# Patient Record
Sex: MALE | Race: WHITE | NOT HISPANIC OR LATINO | Employment: OTHER | URBAN - METROPOLITAN AREA
[De-identification: names, ages, dates, MRNs, and addresses within clinical notes are randomized per-mention and may not be internally consistent; named-entity substitution may affect disease eponyms.]

---

## 2017-01-05 ENCOUNTER — OFFICE VISIT - HEALTHEAST (OUTPATIENT)
Dept: INTERNAL MEDICINE | Facility: CLINIC | Age: 82
End: 2017-01-05

## 2017-01-05 DIAGNOSIS — R56.9 CONVULSIONS, UNSPECIFIED CONVULSION TYPE (H): ICD-10-CM

## 2017-01-05 ASSESSMENT — MIFFLIN-ST. JEOR: SCORE: 1518.14

## 2017-02-06 ENCOUNTER — OFFICE VISIT - HEALTHEAST (OUTPATIENT)
Dept: INTERNAL MEDICINE | Facility: CLINIC | Age: 82
End: 2017-02-06

## 2017-02-06 DIAGNOSIS — G40.209 PARTIAL EPILEPSY WITH IMPAIRMENT OF CONSCIOUSNESS (H): ICD-10-CM

## 2017-02-06 DIAGNOSIS — E55.9 HYPOVITAMINOSIS D: ICD-10-CM

## 2017-02-06 DIAGNOSIS — M50.90 CERVICAL DISC DISEASE: ICD-10-CM

## 2017-02-06 DIAGNOSIS — R25.9 INVOLUNTARY MOVEMENTS: ICD-10-CM

## 2017-02-06 DIAGNOSIS — N40.0 BPH (BENIGN PROSTATIC HYPERPLASIA): ICD-10-CM

## 2017-02-06 DIAGNOSIS — M25.472 ANKLE SWELLING, LEFT: ICD-10-CM

## 2017-02-06 DIAGNOSIS — S43.422S SPRAIN OF LEFT ROTATOR CUFF CAPSULE, SEQUELA: ICD-10-CM

## 2017-02-06 DIAGNOSIS — Z13.29 SCREENING FOR HYPOTHYROIDISM: ICD-10-CM

## 2017-02-06 DIAGNOSIS — K59.00 CONSTIPATION: ICD-10-CM

## 2017-02-06 DIAGNOSIS — K42.9 UMBILICAL HERNIA WITHOUT OBSTRUCTION AND WITHOUT GANGRENE: ICD-10-CM

## 2017-02-06 ASSESSMENT — MIFFLIN-ST. JEOR: SCORE: 1509.07

## 2017-02-07 ENCOUNTER — COMMUNICATION - HEALTHEAST (OUTPATIENT)
Dept: INTERNAL MEDICINE | Facility: CLINIC | Age: 82
End: 2017-02-07

## 2017-03-17 ENCOUNTER — COMMUNICATION - HEALTHEAST (OUTPATIENT)
Dept: INTERNAL MEDICINE | Facility: CLINIC | Age: 82
End: 2017-03-17

## 2017-05-16 ENCOUNTER — COMMUNICATION - HEALTHEAST (OUTPATIENT)
Dept: SCHEDULING | Facility: CLINIC | Age: 82
End: 2017-05-16

## 2017-08-10 ENCOUNTER — RECORDS - HEALTHEAST (OUTPATIENT)
Dept: ADMINISTRATIVE | Facility: OTHER | Age: 82
End: 2017-08-10

## 2017-10-25 ENCOUNTER — OFFICE VISIT - HEALTHEAST (OUTPATIENT)
Dept: PODIATRY | Facility: CLINIC | Age: 82
End: 2017-10-25

## 2017-10-25 DIAGNOSIS — L60.0 INGROWN TOENAIL: ICD-10-CM

## 2017-10-25 DIAGNOSIS — L60.2 ONYCHAUXIS: ICD-10-CM

## 2017-11-08 ENCOUNTER — COMMUNICATION - HEALTHEAST (OUTPATIENT)
Dept: ADMINISTRATIVE | Facility: CLINIC | Age: 82
End: 2017-11-08

## 2017-12-01 ENCOUNTER — COMMUNICATION - HEALTHEAST (OUTPATIENT)
Dept: ADMINISTRATIVE | Facility: CLINIC | Age: 82
End: 2017-12-01

## 2018-01-22 ENCOUNTER — RECORDS - HEALTHEAST (OUTPATIENT)
Dept: ADMINISTRATIVE | Facility: OTHER | Age: 83
End: 2018-01-22

## 2018-05-31 ENCOUNTER — OFFICE VISIT - HEALTHEAST (OUTPATIENT)
Dept: FAMILY MEDICINE | Facility: CLINIC | Age: 83
End: 2018-05-31

## 2018-05-31 ENCOUNTER — RECORDS - HEALTHEAST (OUTPATIENT)
Dept: GENERAL RADIOLOGY | Facility: CLINIC | Age: 83
End: 2018-05-31

## 2018-05-31 DIAGNOSIS — K59.00 CONSTIPATION, UNSPECIFIED: ICD-10-CM

## 2018-05-31 DIAGNOSIS — K59.00 CONSTIPATION: ICD-10-CM

## 2018-05-31 ASSESSMENT — MIFFLIN-ST. JEOR: SCORE: 1482.76

## 2018-06-04 ENCOUNTER — OFFICE VISIT - HEALTHEAST (OUTPATIENT)
Dept: FAMILY MEDICINE | Facility: CLINIC | Age: 83
End: 2018-06-04

## 2018-06-04 DIAGNOSIS — M54.2 NECK PAIN ON RIGHT SIDE: ICD-10-CM

## 2018-07-06 ENCOUNTER — OFFICE VISIT - HEALTHEAST (OUTPATIENT)
Dept: FAMILY MEDICINE | Facility: CLINIC | Age: 83
End: 2018-07-06

## 2018-07-06 DIAGNOSIS — K59.00 CONSTIPATION: ICD-10-CM

## 2018-07-06 DIAGNOSIS — J32.9 SINUSITIS, CHRONIC: ICD-10-CM

## 2018-07-06 DIAGNOSIS — F95.2 TOURETTE'S DISORDER: ICD-10-CM

## 2018-07-06 DIAGNOSIS — N40.0 BPH (BENIGN PROSTATIC HYPERPLASIA): ICD-10-CM

## 2018-07-06 DIAGNOSIS — F41.9 ANXIETY: ICD-10-CM

## 2018-07-06 DIAGNOSIS — M50.30 DDD (DEGENERATIVE DISC DISEASE), CERVICAL: ICD-10-CM

## 2018-07-06 DIAGNOSIS — G40.209 PARTIAL EPILEPSY WITH IMPAIRMENT OF CONSCIOUSNESS (H): ICD-10-CM

## 2018-07-06 DIAGNOSIS — M75.100 ROTATOR CUFF TEAR: ICD-10-CM

## 2018-07-06 LAB
ANION GAP SERPL CALCULATED.3IONS-SCNC: 8 MMOL/L (ref 5–18)
BASOPHILS # BLD AUTO: 0 THOU/UL (ref 0–0.2)
BASOPHILS NFR BLD AUTO: 1 % (ref 0–2)
BUN SERPL-MCNC: 14 MG/DL (ref 8–28)
CALCIUM SERPL-MCNC: 9.3 MG/DL (ref 8.5–10.5)
CHLORIDE BLD-SCNC: 107 MMOL/L (ref 98–107)
CO2 SERPL-SCNC: 28 MMOL/L (ref 22–31)
CREAT SERPL-MCNC: 1 MG/DL (ref 0.7–1.3)
EOSINOPHIL # BLD AUTO: 0.1 THOU/UL (ref 0–0.4)
EOSINOPHIL NFR BLD AUTO: 2 % (ref 0–6)
ERYTHROCYTE [DISTWIDTH] IN BLOOD BY AUTOMATED COUNT: 11.3 % (ref 11–14.5)
GFR SERPL CREATININE-BSD FRML MDRD: >60 ML/MIN/1.73M2
GLUCOSE BLD-MCNC: 95 MG/DL (ref 70–125)
HCT VFR BLD AUTO: 42.4 % (ref 40–54)
HGB BLD-MCNC: 13.9 G/DL (ref 14–18)
LYMPHOCYTES # BLD AUTO: 1.4 THOU/UL (ref 0.8–4.4)
LYMPHOCYTES NFR BLD AUTO: 22 % (ref 20–40)
MCH RBC QN AUTO: 31.9 PG (ref 27–34)
MCHC RBC AUTO-ENTMCNC: 32.8 G/DL (ref 32–36)
MCV RBC AUTO: 97 FL (ref 80–100)
MONOCYTES # BLD AUTO: 0.3 THOU/UL (ref 0–0.9)
MONOCYTES NFR BLD AUTO: 6 % (ref 2–10)
NEUTROPHILS # BLD AUTO: 4.3 THOU/UL (ref 2–7.7)
NEUTROPHILS NFR BLD AUTO: 70 % (ref 50–70)
PLATELET # BLD AUTO: 226 THOU/UL (ref 140–440)
PMV BLD AUTO: 7.9 FL (ref 7–10)
POTASSIUM BLD-SCNC: 4.4 MMOL/L (ref 3.5–5)
RBC # BLD AUTO: 4.35 MILL/UL (ref 4.4–6.2)
SODIUM SERPL-SCNC: 143 MMOL/L (ref 136–145)
TSH SERPL DL<=0.005 MIU/L-ACNC: 1.96 UIU/ML (ref 0.3–5)
WBC: 6.1 THOU/UL (ref 4–11)

## 2018-07-06 ASSESSMENT — MIFFLIN-ST. JEOR: SCORE: 1478.45

## 2018-07-09 ENCOUNTER — COMMUNICATION - HEALTHEAST (OUTPATIENT)
Dept: FAMILY MEDICINE | Facility: CLINIC | Age: 83
End: 2018-07-09

## 2018-07-23 ENCOUNTER — OFFICE VISIT - HEALTHEAST (OUTPATIENT)
Dept: FAMILY MEDICINE | Facility: CLINIC | Age: 83
End: 2018-07-23

## 2018-07-23 ENCOUNTER — COMMUNICATION - HEALTHEAST (OUTPATIENT)
Dept: FAMILY MEDICINE | Facility: CLINIC | Age: 83
End: 2018-07-23

## 2018-07-23 DIAGNOSIS — G45.9 TRANSIENT CEREBRAL ISCHEMIA, UNSPECIFIED TYPE: ICD-10-CM

## 2018-07-23 DIAGNOSIS — G40.209 PARTIAL EPILEPSY WITH IMPAIRMENT OF CONSCIOUSNESS (H): ICD-10-CM

## 2018-07-23 DIAGNOSIS — N40.0 BPH (BENIGN PROSTATIC HYPERPLASIA): ICD-10-CM

## 2018-07-30 ENCOUNTER — COMMUNICATION - HEALTHEAST (OUTPATIENT)
Dept: FAMILY MEDICINE | Facility: CLINIC | Age: 83
End: 2018-07-30

## 2018-08-05 ENCOUNTER — ANESTHESIA - HEALTHEAST (OUTPATIENT)
Dept: SURGERY | Facility: CLINIC | Age: 83
End: 2018-08-05

## 2018-08-05 ENCOUNTER — SURGERY - HEALTHEAST (OUTPATIENT)
Dept: SURGERY | Facility: CLINIC | Age: 83
End: 2018-08-05

## 2018-08-05 ASSESSMENT — MIFFLIN-ST. JEOR: SCORE: 1466.43

## 2018-08-08 ENCOUNTER — COMMUNICATION - HEALTHEAST (OUTPATIENT)
Dept: CARE COORDINATION | Facility: CLINIC | Age: 83
End: 2018-08-08

## 2018-08-09 ENCOUNTER — AMBULATORY - HEALTHEAST (OUTPATIENT)
Dept: CARE COORDINATION | Facility: CLINIC | Age: 83
End: 2018-08-09

## 2018-08-09 DIAGNOSIS — G45.9 TIA (TRANSIENT ISCHEMIC ATTACK): ICD-10-CM

## 2018-08-13 ENCOUNTER — COMMUNICATION - HEALTHEAST (OUTPATIENT)
Dept: FAMILY MEDICINE | Facility: CLINIC | Age: 83
End: 2018-08-13

## 2018-08-13 DIAGNOSIS — K42.9 UMBILICAL HERNIA WITHOUT OBSTRUCTION AND WITHOUT GANGRENE: ICD-10-CM

## 2018-08-13 DIAGNOSIS — N40.0 BPH (BENIGN PROSTATIC HYPERPLASIA): ICD-10-CM

## 2018-08-20 ENCOUNTER — AMBULATORY - HEALTHEAST (OUTPATIENT)
Dept: FAMILY MEDICINE | Facility: CLINIC | Age: 83
End: 2018-08-20

## 2018-08-20 ENCOUNTER — COMMUNICATION - HEALTHEAST (OUTPATIENT)
Dept: SCHEDULING | Facility: CLINIC | Age: 83
End: 2018-08-20

## 2018-08-27 ENCOUNTER — AMBULATORY - HEALTHEAST (OUTPATIENT)
Dept: FAMILY MEDICINE | Facility: CLINIC | Age: 83
End: 2018-08-27

## 2018-08-27 DIAGNOSIS — G45.9 TRANSIENT CEREBRAL ISCHEMIA, UNSPECIFIED TYPE: ICD-10-CM

## 2018-09-27 ENCOUNTER — COMMUNICATION - HEALTHEAST (OUTPATIENT)
Dept: NURSING | Facility: CLINIC | Age: 83
End: 2018-09-27

## 2018-10-03 ENCOUNTER — COMMUNICATION - HEALTHEAST (OUTPATIENT)
Dept: NURSING | Facility: CLINIC | Age: 83
End: 2018-10-03

## 2018-10-18 ENCOUNTER — COMMUNICATION - HEALTHEAST (OUTPATIENT)
Dept: SCHEDULING | Facility: CLINIC | Age: 83
End: 2018-10-18

## 2018-10-22 ENCOUNTER — COMMUNICATION - HEALTHEAST (OUTPATIENT)
Dept: FAMILY MEDICINE | Facility: CLINIC | Age: 83
End: 2018-10-22

## 2018-10-22 ENCOUNTER — OFFICE VISIT - HEALTHEAST (OUTPATIENT)
Dept: FAMILY MEDICINE | Facility: CLINIC | Age: 83
End: 2018-10-22

## 2018-10-22 DIAGNOSIS — K59.00 CONSTIPATION: ICD-10-CM

## 2018-10-22 DIAGNOSIS — F95.2 TOURETTE'S DISORDER: ICD-10-CM

## 2018-10-22 DIAGNOSIS — R41.3 MEMORY LOSS: ICD-10-CM

## 2018-10-22 DIAGNOSIS — L29.0 ANAL ITCHING: ICD-10-CM

## 2018-10-26 ENCOUNTER — AMBULATORY - HEALTHEAST (OUTPATIENT)
Dept: FAMILY MEDICINE | Facility: CLINIC | Age: 83
End: 2018-10-26

## 2018-10-30 ENCOUNTER — COMMUNICATION - HEALTHEAST (OUTPATIENT)
Dept: FAMILY MEDICINE | Facility: CLINIC | Age: 83
End: 2018-10-30

## 2019-01-10 ENCOUNTER — COMMUNICATION - HEALTHEAST (OUTPATIENT)
Dept: SCHEDULING | Facility: CLINIC | Age: 84
End: 2019-01-10

## 2019-01-11 ENCOUNTER — AMBULATORY - HEALTHEAST (OUTPATIENT)
Dept: FAMILY MEDICINE | Facility: CLINIC | Age: 84
End: 2019-01-11

## 2019-01-11 DIAGNOSIS — K59.00 CONSTIPATION, UNSPECIFIED CONSTIPATION TYPE: ICD-10-CM

## 2019-01-16 ENCOUNTER — COMMUNICATION - HEALTHEAST (OUTPATIENT)
Dept: FAMILY MEDICINE | Facility: CLINIC | Age: 84
End: 2019-01-16

## 2019-01-16 DIAGNOSIS — N40.0 BENIGN PROSTATIC HYPERPLASIA WITHOUT LOWER URINARY TRACT SYMPTOMS: ICD-10-CM

## 2019-01-30 ENCOUNTER — COMMUNICATION - HEALTHEAST (OUTPATIENT)
Dept: SCHEDULING | Facility: CLINIC | Age: 84
End: 2019-01-30

## 2019-04-22 ENCOUNTER — COMMUNICATION - HEALTHEAST (OUTPATIENT)
Dept: SCHEDULING | Facility: CLINIC | Age: 84
End: 2019-04-22

## 2019-05-09 ENCOUNTER — RECORDS - HEALTHEAST (OUTPATIENT)
Dept: SCHEDULING | Facility: CLINIC | Age: 84
End: 2019-05-09

## 2019-05-09 ENCOUNTER — COMMUNICATION - HEALTHEAST (OUTPATIENT)
Dept: SCHEDULING | Facility: CLINIC | Age: 84
End: 2019-05-09

## 2019-05-10 ENCOUNTER — OFFICE VISIT - HEALTHEAST (OUTPATIENT)
Dept: FAMILY MEDICINE | Facility: CLINIC | Age: 84
End: 2019-05-10

## 2019-05-10 ENCOUNTER — COMMUNICATION - HEALTHEAST (OUTPATIENT)
Dept: FAMILY MEDICINE | Facility: CLINIC | Age: 84
End: 2019-05-10

## 2019-05-10 DIAGNOSIS — K59.00 CONSTIPATION, UNSPECIFIED CONSTIPATION TYPE: ICD-10-CM

## 2019-05-10 DIAGNOSIS — K92.1 BLACK STOOL: ICD-10-CM

## 2019-05-10 LAB
ERYTHROCYTE [DISTWIDTH] IN BLOOD BY AUTOMATED COUNT: 12.1 % (ref 11–14.5)
HCT VFR BLD AUTO: 41.8 % (ref 40–54)
HGB BLD-MCNC: 13.9 G/DL (ref 14–18)
MCH RBC QN AUTO: 32.6 PG (ref 27–34)
MCHC RBC AUTO-ENTMCNC: 33.2 G/DL (ref 32–36)
MCV RBC AUTO: 98 FL (ref 80–100)
PLATELET # BLD AUTO: 185 THOU/UL (ref 140–440)
PMV BLD AUTO: 8.1 FL (ref 7–10)
RBC # BLD AUTO: 4.25 MILL/UL (ref 4.4–6.2)
WBC: 5.7 THOU/UL (ref 4–11)

## 2019-05-13 ENCOUNTER — COMMUNICATION - HEALTHEAST (OUTPATIENT)
Dept: SCHEDULING | Facility: CLINIC | Age: 84
End: 2019-05-13

## 2019-06-20 ENCOUNTER — RECORDS - HEALTHEAST (OUTPATIENT)
Dept: ADMINISTRATIVE | Facility: OTHER | Age: 84
End: 2019-06-20

## 2019-07-29 ENCOUNTER — RECORDS - HEALTHEAST (OUTPATIENT)
Dept: ADMINISTRATIVE | Facility: OTHER | Age: 84
End: 2019-07-29

## 2020-01-27 ENCOUNTER — COMMUNICATION - HEALTHEAST (OUTPATIENT)
Dept: SCHEDULING | Facility: CLINIC | Age: 85
End: 2020-01-27

## 2020-03-10 ENCOUNTER — RECORDS - HEALTHEAST (OUTPATIENT)
Dept: ADMINISTRATIVE | Facility: OTHER | Age: 85
End: 2020-03-10

## 2020-03-12 ENCOUNTER — COMMUNICATION - HEALTHEAST (OUTPATIENT)
Dept: FAMILY MEDICINE | Facility: CLINIC | Age: 85
End: 2020-03-12

## 2020-03-12 DIAGNOSIS — N40.0 BENIGN PROSTATIC HYPERPLASIA WITHOUT LOWER URINARY TRACT SYMPTOMS: ICD-10-CM

## 2020-04-13 ENCOUNTER — COMMUNICATION - HEALTHEAST (OUTPATIENT)
Dept: SCHEDULING | Facility: CLINIC | Age: 85
End: 2020-04-13

## 2020-06-11 ENCOUNTER — COMMUNICATION - HEALTHEAST (OUTPATIENT)
Dept: FAMILY MEDICINE | Facility: CLINIC | Age: 85
End: 2020-06-11

## 2020-06-11 DIAGNOSIS — N40.0 BENIGN PROSTATIC HYPERPLASIA WITHOUT LOWER URINARY TRACT SYMPTOMS: ICD-10-CM

## 2020-08-25 ENCOUNTER — OFFICE VISIT - HEALTHEAST (OUTPATIENT)
Dept: FAMILY MEDICINE | Facility: CLINIC | Age: 85
End: 2020-08-25

## 2020-08-25 DIAGNOSIS — F95.2 TOURETTE'S: ICD-10-CM

## 2020-08-25 DIAGNOSIS — G40.209 PARTIAL EPILEPSY WITH IMPAIRMENT OF CONSCIOUSNESS (H): ICD-10-CM

## 2020-08-25 DIAGNOSIS — G45.9 TIA (TRANSIENT ISCHEMIC ATTACK): ICD-10-CM

## 2020-08-25 DIAGNOSIS — R22.0 CHEEK SWELLING: ICD-10-CM

## 2020-08-25 DIAGNOSIS — K59.00 CONSTIPATION, UNSPECIFIED CONSTIPATION TYPE: ICD-10-CM

## 2020-08-25 DIAGNOSIS — R41.3 MEMORY LOSS: ICD-10-CM

## 2020-09-06 ENCOUNTER — COMMUNICATION - HEALTHEAST (OUTPATIENT)
Dept: FAMILY MEDICINE | Facility: CLINIC | Age: 85
End: 2020-09-06

## 2020-09-06 DIAGNOSIS — N40.0 BENIGN PROSTATIC HYPERPLASIA WITHOUT LOWER URINARY TRACT SYMPTOMS: ICD-10-CM

## 2020-09-15 ENCOUNTER — VIRTUAL VISIT (OUTPATIENT)
Dept: NEUROLOGY | Facility: CLINIC | Age: 85
End: 2020-09-15
Payer: MEDICARE

## 2020-09-15 ENCOUNTER — RECORDS - HEALTHEAST (OUTPATIENT)
Dept: ADMINISTRATIVE | Facility: OTHER | Age: 85
End: 2020-09-15

## 2020-09-15 VITALS — WEIGHT: 176 LBS | BODY MASS INDEX: 26.67 KG/M2 | HEIGHT: 68 IN

## 2020-09-15 DIAGNOSIS — R41.3 MEMORY LOSS: ICD-10-CM

## 2020-09-15 DIAGNOSIS — G40.209 PARTIAL EPILEPSY WITH IMPAIRMENT OF CONSCIOUSNESS (H): Primary | ICD-10-CM

## 2020-09-15 PROBLEM — J32.9 CHRONIC SINUSITIS: Status: ACTIVE | Noted: 2020-09-15

## 2020-09-15 PROBLEM — M75.100 ROTATOR CUFF TEAR: Status: ACTIVE | Noted: 2018-07-06

## 2020-09-15 PROBLEM — F95.2 TOURETTE'S: Status: ACTIVE | Noted: 2020-09-15

## 2020-09-15 PROBLEM — G45.9 TIA (TRANSIENT ISCHEMIC ATTACK): Status: ACTIVE | Noted: 2018-07-21

## 2020-09-15 PROBLEM — M50.30 DDD (DEGENERATIVE DISC DISEASE), CERVICAL: Status: ACTIVE | Noted: 2018-07-06

## 2020-09-15 PROBLEM — F41.9 ANXIETY: Status: ACTIVE | Noted: 2020-09-15

## 2020-09-15 PROCEDURE — 99443 ZZC PHYSICIAN TELEPHONE EVALUATION 21-30 MIN: CPT | Performed by: PSYCHIATRY & NEUROLOGY

## 2020-09-15 RX ORDER — ASPIRIN 81 MG/1
81 TABLET ORAL DAILY
COMMUNITY

## 2020-09-15 RX ORDER — ASPIRIN 81 MG
100 TABLET, DELAYED RELEASE (ENTERIC COATED) ORAL
COMMUNITY

## 2020-09-15 RX ORDER — TAMSULOSIN HYDROCHLORIDE 0.4 MG/1
CAPSULE ORAL
COMMUNITY
Start: 2020-06-12

## 2020-09-15 ASSESSMENT — MIFFLIN-ST. JEOR: SCORE: 1452.83

## 2020-09-15 NOTE — PROGRESS NOTES
NEUROLOGY NOTE        Assessment/Plan        Recurrent spells: seizure most likely  Memory difficulty  Tourette syndrome  Anxiety  Chronic constipation    Plan:   Check B12 and thiamine.   Check orthostatic Bp 2 days/week, and 2 time /day. Bring journal to next visit.   EEG followed by clinic visit with memory test      This is a telephone visit due to COVID-19 Pandemic to mitigate potential disease spreading. Consent to charge obtained for call visit. Total time spent about 30 minutes.         SUBJECTIVE       Wayne Carlton is a 86 year old male seen for memory difficulty and spells.      See Dr. Cohn in 2015 for seizure activity versus a TIA,  He has had numerous episodes over the years, usually 1/year, but had 3 spell within last 3 months,  where he will suddenly get difficulty speaking this can last 15 to 45 minutes and then resolve spontaneously.  It usually starts with vision difficulty in the right. Feels weak afterwards for 2-4 hours. His first event was in 2001. He was just seen in the emergency room on August 16 exactly for this.  He had a CTA of the head and neck as well as an MRI of the brain that did not show any acute findings.     He tried keppra and zonegran not able to tolerate.  seizure medication in the past, however he did not tolerate it and did not stay on it long-term.     Memory: not doing well.     EEG 2013 normal.        IMPRESSION: 8/2020  CONCLUSION:   HEAD CT:  1.  No evidence of acute hemorrhage, mass effect, or acute vascular territorial infarction.  2.  Age-related changes as above.     HEAD CTA:   1.  No evidence of proximal large vessel occlusion or flow-limiting stenosis.     NECK CTA:  1.  No evidence of hemodynamically significant stenosis based on NASCET criteria.  2.  No evidence of dissection or pseudoaneurysm.     IMPRESSION: 8/16/20 MRI  1.  No acute infarct, mass, mass effect, or hemorrhage.  2.  Moderate atrophy.  3.  Mild to moderate chronic small vessel  "ischemia.    2020 labs:  CBC, BMP, troponin, INR, PTT and ESR nl in 1 and 2020  Had abnormal LFT 2019, returned normal 2020.              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  Fluphenazine-prescribed but patient did not take, a future possibility.       Chronic sinusitis 09/15/2020     Priority: Medium     Longstanding  constant nasal congestion and post-nasal drainage  Tried-numerous sprays, etc       Anxiety 09/15/2020     Priority: Medium     Longstanding  Prior meds       TIA (transient ischemic attack) 2018     Priority: Medium     Presumed.  See also concern for a partial complex seizures versus TIAs  Treat as TIA  ASA, simvastatin       Rotator cuff tear 2018     Priority: Medium     Left sided  No surgery       DDD (degenerative disc disease), cervical 2018     Priority: Medium     Conservative           Past medical history     History reviewed. No pertinent surgical history.    History reviewed. No pertinent past medical history.        Family history     Family History   Problem Relation Age of Onset     Cancer Father       Jaundice Mother            at 97, with jaundice.     Leukemia Father            at 47 - sounds like " AML     Heart disease Brother            of a heart attack.     Hypertension Brother       Blindness Brother          Social history     Social History     Socioeconomic History     Marital status:      Spouse name: Not on file     Number of children: Not on file     Years of education: Not on file     Highest education level: Not on file   Occupational History     Not on file   Social Needs     Financial resource strain: Not on file     Food insecurity     Worry: Not on file     Inability: Not on file     Transportation needs     Medical: Not on file     Non-medical: Not on file   Tobacco Use     Smoking status: Never Smoker     Smokeless tobacco: Never Used   Substance and Sexual Activity     Alcohol use: Not Currently     Drug use: Not on file     Sexual activity: Not on file   Lifestyle     Physical activity     Days per week: Not on file     Minutes per session: Not on file     Stress: Not on file   Relationships     Social connections     Talks on phone: Not on file     Gets together: Not on file     Attends Scientologist service: Not on file     Active member of club or organization: Not on file     Attends meetings of clubs or organizations: Not on file     Relationship status: Not on file     Intimate partner violence     Fear of current or ex partner: Not on file     Emotionally abused: Not on file     Physically abused: Not on file     Forced sexual activity: Not on file   Other Topics Concern     Parent/sibling w/ CABG, MI or angioplasty before 65F 55M? Not Asked   Social History Narrative     Not on file         Allergy     Sulfa drugs    MEDICATIONS List     Current Outpatient Medications   Medication Sig Dispense Refill     aspirin 81 MG EC tablet Take 81 mg by mouth daily       docusate sodium (COLACE) 100 MG tablet Take 100 mg by mouth       melatonin (MELATONIN MAXIMUM STRENGTH) 5 MG tablet Take 5 mg by mouth       tamsulosin (FLOMAX) 0.4 MG capsule TAKE 1 CAPSULE (0.4 MG TOTAL) BY MOUTH  DAILY AFTER SUPPER. ** MUST BEE SEEN FOR FUTURE FILLS                   Mikayla Garcia MD, MD, PhD  Neurology   Office tel: [phone]

## 2020-09-22 ENCOUNTER — COMMUNICATION - HEALTHEAST (OUTPATIENT)
Dept: SCHEDULING | Facility: CLINIC | Age: 85
End: 2020-09-22

## 2020-09-23 ENCOUNTER — OFFICE VISIT - HEALTHEAST (OUTPATIENT)
Dept: FAMILY MEDICINE | Facility: CLINIC | Age: 85
End: 2020-09-23

## 2020-09-23 ENCOUNTER — COMMUNICATION - HEALTHEAST (OUTPATIENT)
Dept: SCHEDULING | Facility: CLINIC | Age: 85
End: 2020-09-23

## 2020-09-23 DIAGNOSIS — K59.00 CONSTIPATION, UNSPECIFIED CONSTIPATION TYPE: ICD-10-CM

## 2020-09-23 DIAGNOSIS — S39.011A STRAIN OF ABDOMINAL MUSCLE, INITIAL ENCOUNTER: ICD-10-CM

## 2020-09-29 ENCOUNTER — RECORDS - HEALTHEAST (OUTPATIENT)
Dept: LAB | Facility: CLINIC | Age: 85
End: 2020-09-29

## 2020-10-02 LAB — VIT B1 PYROPHOSHATE BLD-SCNC: 129 NMOL/L (ref 70–180)

## 2020-10-28 ENCOUNTER — OFFICE VISIT (OUTPATIENT)
Dept: NEUROLOGY | Facility: CLINIC | Age: 85
End: 2020-10-28
Payer: MEDICARE

## 2020-10-28 VITALS
WEIGHT: 176 LBS | SYSTOLIC BLOOD PRESSURE: 117 MMHG | HEIGHT: 68 IN | BODY MASS INDEX: 26.67 KG/M2 | HEART RATE: 84 BPM | DIASTOLIC BLOOD PRESSURE: 67 MMHG

## 2020-10-28 DIAGNOSIS — R41.3 MEMORY LOSS: Primary | ICD-10-CM

## 2020-10-28 DIAGNOSIS — H90.0 CONDUCTIVE HEARING LOSS, BILATERAL: ICD-10-CM

## 2020-10-28 DIAGNOSIS — M25.511 CHRONIC PAIN OF BOTH SHOULDERS: ICD-10-CM

## 2020-10-28 DIAGNOSIS — F41.1 GENERALIZED ANXIETY DISORDER: ICD-10-CM

## 2020-10-28 DIAGNOSIS — M25.512 CHRONIC PAIN OF BOTH SHOULDERS: ICD-10-CM

## 2020-10-28 DIAGNOSIS — R56.9 SEIZURE (H): ICD-10-CM

## 2020-10-28 DIAGNOSIS — G89.29 CHRONIC PAIN OF BOTH SHOULDERS: ICD-10-CM

## 2020-10-28 DIAGNOSIS — F33.1 MODERATE EPISODE OF RECURRENT MAJOR DEPRESSIVE DISORDER (H): ICD-10-CM

## 2020-10-28 DIAGNOSIS — G25.0 ESSENTIAL TREMOR: ICD-10-CM

## 2020-10-28 DIAGNOSIS — F95.2 TOURETTE'S: ICD-10-CM

## 2020-10-28 PROCEDURE — 99215 OFFICE O/P EST HI 40 MIN: CPT | Performed by: PSYCHIATRY & NEUROLOGY

## 2020-10-28 RX ORDER — DIVALPROEX SODIUM 500 MG/1
500 TABLET, EXTENDED RELEASE ORAL DAILY
Qty: 30 TABLET | Refills: 3 | Status: SHIPPED | OUTPATIENT
Start: 2020-10-28

## 2020-10-28 ASSESSMENT — MONTREAL COGNITIVE ASSESSMENT (MOCA)
12. MEMORY INDEX SCORE: 2
4. NAME EACH OF THE THREE ANIMALS SHOWN: 3
13. ORIENTATION SUBSCORE: 6
11. FOR EACH PAIR OF WORDS, WHAT CATEGORY DO THEY BELONG TO (OUT OF 2): 2
10. [FLUENCY] NAME WORDS STARTING WITH DESIGNATED LETTER: 1
6. READ LIST OF DIGITS [FORWARD/BACKWARD]: 2
7. [VIGILENCE] TAP WHEN HEARING DESIGNATED LETTER: 1
8. SERIAL SUBTRACTION OF 7S: 3
9. REPEAT EACH SENTENCE: 2
VISUOSPATIAL/EXECUTIVE SUBSCORE: 4
WHAT IS THE TOTAL SCORE (OUT OF 30): 26
WHAT LEVEL OF EDUCATION WAS ATTAINED: 0

## 2020-10-28 ASSESSMENT — MIFFLIN-ST. JEOR: SCORE: 1452.83

## 2020-10-28 NOTE — PROGRESS NOTES
NEUROLOGY NOTE        Assessment/Plan            Recurrent spells: seizure most likely    Memory difficulty: MOCA 26/30 on 10/28/2020    Tourette syndrome    Anxiety and depression since young age.  We will see Depakote helps if not consider adding Buspar.     Chronic constipation    Essential tremor    Left frozen shoulder and right shoulder pain.  May need therapy and to follow-up with primary.    Hard of hearing bilaterally with hearing aids which cause ear drainage and skin disruptions       Plan:     Start Depakote 500 mg daily at bedtime time.    Blood test in about 1 week after starting the above medications at [facility]    Follow-up with me in about 1 months to discuss above test results    We will discuss about memory medications or need to further manage anxiety/depressions.           SUBJECTIVE         Wayne Carlton is a 86 year old male seen for memory difficulty and spells.       During the daytime have spells: Feeling trouble to see things through 1 side of the eye followed by language and speech difficulty lasting for about 30 minutes then recover.    Spell at nighttime: Suddenly feel about the whole body tremors lasting for about 30 minutes to 2 hours, with normal cognitive function.    Hard of hearing: Has hearing aid but causing significant ear drainage and skin disruptions.    Diagnosed with Tourette syndrome with eye twitching on the right side.  Has also start during when very young age.    Essential tremor for a long time: Mild to degree.  The tremor is positional and axonal.    Shoulder pains: Left frozen shoulder very limited range of motion.  Right shoulder having pain but still having adequate range of motion but causing pain.    See Dr. Cohn in 2015 for seizure activity versus a TIA,  He has had numerous episodes over the years, usually 1/year, but had 3 spell within last 3 months,  where he will suddenly get difficulty speaking this can last 15 to 45 minutes and then  "resolve spontaneously.  It usually starts with vision difficulty in the right. Feels weak afterwards for 2-4 hours. His first event was in 2001. He was just seen in the emergency room on August 16 exactly for this.  He had a CTA of the head and neck as well as an MRI of the brain that did not show any acute findings.      He tried keppra and zonegran not able to tolerate.  seizure medication in the past, however he did not tolerate it and did not stay on it long-term.     Memory: not doing well.      EEG 2013 normal.          IMPRESSION: 8/2020  CONCLUSION:   HEAD CT:  1.  No evidence of acute hemorrhage, mass effect, or acute vascular territorial infarction.  2.  Age-related changes as above.     HEAD CTA:   1.  No evidence of proximal large vessel occlusion or flow-limiting stenosis.     NECK CTA:  1.  No evidence of hemodynamically significant stenosis based on NASCET criteria.  2.  No evidence of dissection or pseudoaneurysm.     IMPRESSION: 8/16/20 MRI  1.  No acute infarct, mass, mass effect, or hemorrhage.  2.  Moderate atrophy.  3.  Mild to moderate chronic small vessel ischemia.     8/2020 labs:  CBC, BMP, troponin, INR, PTT and ESR nl in 1 and 8/2020  Had abnormal LFT 12/2019, returned normal 1/2020.      Thiamine level normal.           Review of system     10 point system review otherwise unremarkable    PHYSICAL EXAMINATION     Vital signs in last 24 hours:  Vitals:    10/28/20 0955   BP: 117/67   Pulse: 84   Weight: 79.8 kg (176 lb)   Height: 1.727 m (5' 8\")       Very pleasant sitting in chair no acute distress.  Exam of the head, neck, eyes, ears, nose and mouth negative.  No edema or skin rashes.  No thyromegaly, JVD or jaundice.  No breathing difficulty.  Normal mental status, language and speech.  Cranial nerves II through XII largely intact except hard of hearing bilaterally.  Range of motion impaired on the left side and around the shoulder.  Not able to lift the upper extremity beyond 30 " degree.  Normal muscle bulk distally and strength in 4 extremities.  No focal sensory difficulty.  Hand coordination adequate.  Does not shuffling gait slightly stooped body posture.  Bilateral hand tremor that is positional and with action as well as target approach mild to degree.    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  Fluphenazine-prescribed but patient did not take, a future possibility.       Chronic sinusitis 09/15/2020     Priority: Medium     Longstanding  constant nasal congestion and post-nasal drainage  Tried-numerous sprays, etc       Anxiety 09/15/2020     Priority: Medium     Longstanding  Prior meds       TIA (transient ischemic attack) 07/21/2018     Priority: Medium     Presumed.  See also concern for a partial complex seizures versus TIAs  Treat as TIA  ASA, simvastatin       Rotator cuff tear 07/06/2018     Priority: Medium     Left sided  No surgery       DDD (degenerative disc disease), cervical 07/06/2018     Priority: Medium     Conservative           Past medical history     Andreafski  Memory difficulty    Family history     Family History   Problem Relation Age of Onset     Cancer Father          Social history     Social History     Socioeconomic History     Marital status:      Spouse name: Not on file     Number of children: Not on file     Years of education: Not on file     Highest education level: Not on file   Occupational History     Not on file   Social Needs     Financial  resource strain: Not on file     Food insecurity     Worry: Not on file     Inability: Not on file     Transportation needs     Medical: Not on file     Non-medical: Not on file   Tobacco Use     Smoking status: Never Smoker     Smokeless tobacco: Never Used   Substance and Sexual Activity     Alcohol use: Not Currently     Drug use: Not on file     Sexual activity: Not on file   Lifestyle     Physical activity     Days per week: Not on file     Minutes per session: Not on file     Stress: Not on file   Relationships     Social connections     Talks on phone: Not on file     Gets together: Not on file     Attends Evangelical service: Not on file     Active member of club or organization: Not on file     Attends meetings of clubs or organizations: Not on file     Relationship status: Not on file     Intimate partner violence     Fear of current or ex partner: Not on file     Emotionally abused: Not on file     Physically abused: Not on file     Forced sexual activity: Not on file   Other Topics Concern     Parent/sibling w/ CABG, MI or angioplasty before 65F 55M? Not Asked   Social History Narrative     Not on file         Allergy     Sulfa drugs    MEDICATIONS List     Current Outpatient Medications   Medication Sig Dispense Refill     aspirin 81 MG EC tablet Take 81 mg by mouth daily       docusate sodium (COLACE) 100 MG tablet Take 100 mg by mouth       melatonin (MELATONIN MAXIMUM STRENGTH) 5 MG tablet Take 5 mg by mouth       tamsulosin (FLOMAX) 0.4 MG capsule TAKE 1 CAPSULE (0.4 MG TOTAL) BY MOUTH DAILY AFTER SUPPER. ** MUST BEE SEEN FOR FUTURE FILLS                 Mikayla Garcia MD, MD, PhD  Neurology   Office tel: [phone]        This note was dictated using voice recognition software.  Any grammatical or context distortions are unintentional and inherent to the software. The note is tailored to serve physicians for communications among them, who knows what are the most important elements of history taken for  disease diagnosis and differentials as well as management plans. Due to time factors, the notes are in general not reviewed before signing. Again due to time factor, follow-up notes often carries over old notes if they are relevant, so most clinic time is dedicated to interviewing with patients and caregivers, on clinical assessment, coordinating care and management.

## 2020-10-28 NOTE — LETTER
10/28/2020         RE: Wayne Carlton  [address] [id number]        Dear Colleague,    Thank you for referring your patient, Wayne Carlton, to the [facility] [place]. Please see a copy of my visit note below.        NEUROLOGY NOTE        Assessment/Plan            Recurrent spells: seizure most likely    Memory difficulty: MOCA 26/30 on 10/28/2020    Tourette syndrome    Anxiety and depression since young age.  We will see Depakote helps if not consider adding Buspar.     Chronic constipation    Essential tremor    Left frozen shoulder and right shoulder pain.  May need therapy and to follow-up with primary.    Hard of hearing bilaterally with hearing aids which cause ear drainage and skin disruptions       Plan:     Start Depakote 500 mg daily at bedtime time.    Blood test in about 1 week after starting the above medications at [facility]    Follow-up with me in about 1 months to discuss above test results    We will discuss about memory medications or need to further manage anxiety/depressions.           SUBJECTIVE         Wayne Carlton is a 86 year old male seen for memory difficulty and spells.       During the daytime have spells: Feeling trouble to see things through 1 side of the eye followed by language and speech difficulty lasting for about 30 minutes then recover.    Spell at nighttime: Suddenly feel about the whole body tremors lasting for about 30 minutes to 2 hours, with normal cognitive function.    Hard of hearing: Has hearing aid but causing significant ear drainage and skin disruptions.    Diagnosed with Tourette syndrome with eye twitching on the right side.  Has also start during when very young age.    Essential tremor for a long time: Mild to degree.  The tremor is positional and axonal.    Shoulder pains: Left frozen shoulder very limited range of motion.  Right shoulder having pain but still having adequate range of motion but  "causing pain.    See Dr. Cohn in 2015 for seizure activity versus a TIA,  He has had numerous episodes over the years, usually 1/year, but had 3 spell within last 3 months,  where he will suddenly get difficulty speaking this can last 15 to 45 minutes and then resolve spontaneously.  It usually starts with vision difficulty in the right. Feels weak afterwards for 2-4 hours. His first event was in 2001. He was just seen in the emergency room on August 16 exactly for this.  He had a CTA of the head and neck as well as an MRI of the brain that did not show any acute findings.      He tried keppra and zonegran not able to tolerate.  seizure medication in the past, however he did not tolerate it and did not stay on it long-term.     Memory: not doing well.      EEG 2013 normal.          IMPRESSION: 8/2020  CONCLUSION:   HEAD CT:  1.  No evidence of acute hemorrhage, mass effect, or acute vascular territorial infarction.  2.  Age-related changes as above.     HEAD CTA:   1.  No evidence of proximal large vessel occlusion or flow-limiting stenosis.     NECK CTA:  1.  No evidence of hemodynamically significant stenosis based on NASCET criteria.  2.  No evidence of dissection or pseudoaneurysm.     IMPRESSION: 8/16/20 MRI  1.  No acute infarct, mass, mass effect, or hemorrhage.  2.  Moderate atrophy.  3.  Mild to moderate chronic small vessel ischemia.     8/2020 labs:  CBC, BMP, troponin, INR, PTT and ESR nl in 1 and 8/2020  Had abnormal LFT 12/2019, returned normal 1/2020.      Thiamine level normal.           Review of system     10 point system review otherwise unremarkable    PHYSICAL EXAMINATION     Vital signs in last 24 hours:  Vitals:    10/28/20 0955   BP: 117/67   Pulse: 84   Weight: 79.8 kg (176 lb)   Height: 1.727 m (5' 8\")       Very pleasant sitting in chair no acute distress.  Exam of the head, neck, eyes, ears, nose and mouth negative.  No edema or skin rashes.  No thyromegaly, JVD or jaundice.  No " breathing difficulty.  Normal mental status, language and speech.  Cranial nerves II through XII largely intact except hard of hearing bilaterally.  Range of motion impaired on the left side and around the shoulder.  Not able to lift the upper extremity beyond 30 degree.  Normal muscle bulk distally and strength in 4 extremities.  No focal sensory difficulty.  Hand coordination adequate.  Does not shuffling gait slightly stooped body posture.  Bilateral hand tremor that is positional and with action as well as target approach mild to degree.    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  Fluphenazine-prescribed but patient did not take, a future possibility.       Chronic sinusitis 09/15/2020     Priority: Medium     Longstanding  constant nasal congestion and post-nasal drainage  Tried-numerous sprays, etc       Anxiety 09/15/2020     Priority: Medium     Longstanding  Prior meds       TIA (transient ischemic attack) 07/21/2018     Priority: Medium     Presumed.  See also concern for a partial complex seizures versus TIAs  Treat as TIA  ASA, simvastatin       Rotator cuff tear 07/06/2018     Priority: Medium     Left sided  No surgery       DDD (degenerative disc disease), cervical 07/06/2018     Priority: Medium     Conservative           Past medical history     Inupiat  Memory difficulty    Family history     Family History   Problem Relation Age of Onset     Cancer Father          Social history     Social History      Socioeconomic History     Marital status:      Spouse name: Not on file     Number of children: Not on file     Years of education: Not on file     Highest education level: Not on file   Occupational History     Not on file   Social Needs     Financial resource strain: Not on file     Food insecurity     Worry: Not on file     Inability: Not on file     Transportation needs     Medical: Not on file     Non-medical: Not on file   Tobacco Use     Smoking status: Never Smoker     Smokeless tobacco: Never Used   Substance and Sexual Activity     Alcohol use: Not Currently     Drug use: Not on file     Sexual activity: Not on file   Lifestyle     Physical activity     Days per week: Not on file     Minutes per session: Not on file     Stress: Not on file   Relationships     Social connections     Talks on phone: Not on file     Gets together: Not on file     Attends Islam service: Not on file     Active member of club or organization: Not on file     Attends meetings of clubs or organizations: Not on file     Relationship status: Not on file     Intimate partner violence     Fear of current or ex partner: Not on file     Emotionally abused: Not on file     Physically abused: Not on file     Forced sexual activity: Not on file   Other Topics Concern     Parent/sibling w/ CABG, MI or angioplasty before 65F 55M? Not Asked   Social History Narrative     Not on file         Allergy     Sulfa drugs    MEDICATIONS List     Current Outpatient Medications   Medication Sig Dispense Refill     aspirin 81 MG EC tablet Take 81 mg by mouth daily       docusate sodium (COLACE) 100 MG tablet Take 100 mg by mouth       melatonin (MELATONIN MAXIMUM STRENGTH) 5 MG tablet Take 5 mg by mouth       tamsulosin (FLOMAX) 0.4 MG capsule TAKE 1 CAPSULE (0.4 MG TOTAL) BY MOUTH DAILY AFTER SUPPER. ** MUST BEE SEEN FOR FUTURE FILLS                 Mikayla Garcia MD, MD, PhD  Neurology   Office tel: [phone]        This note was dictated  using voice recognition software.  Any grammatical or context distortions are unintentional and inherent to the software. The note is tailored to serve physicians for communications among them, who knows what are the most important elements of history taken for disease diagnosis and differentials as well as management plans. Due to time factors, the notes are in general not reviewed before signing. Again due to time factor, follow-up notes often carries over old notes if they are relevant, so most clinic time is dedicated to interviewing with patients and caregivers, on clinical assessment, coordinating care and management.         Again, thank you for allowing me to participate in the care of your patient.        Sincerely,        Mikayla Garcia MD

## 2020-10-28 NOTE — PATIENT INSTRUCTIONS
Plan:     Start Depakote 500 mg daily at bedtime time.    Blood test in about 1 week after starting the above medications at [facility]    Follow-up with me in about 1 months to discuss above test results    We will discuss about memory medications or need to further manage anxiety/depressions.

## 2020-11-04 ENCOUNTER — AMBULATORY - HEALTHEAST (OUTPATIENT)
Dept: LAB | Facility: CLINIC | Age: 85
End: 2020-11-04

## 2020-11-04 DIAGNOSIS — R56.9 SEIZURE (H): ICD-10-CM

## 2020-11-04 LAB
ALBUMIN SERPL-MCNC: 3.3 G/DL (ref 3.5–5)
ALP SERPL-CCNC: 62 U/L (ref 45–120)
ALT SERPL W P-5'-P-CCNC: 15 U/L (ref 0–45)
AST SERPL W P-5'-P-CCNC: 22 U/L (ref 0–40)
BILIRUB DIRECT SERPL-MCNC: 0.2 MG/DL
BILIRUB SERPL-MCNC: 0.5 MG/DL (ref 0–1)
ERYTHROCYTE [DISTWIDTH] IN BLOOD BY AUTOMATED COUNT: 11.4 % (ref 11–14.5)
HCT VFR BLD AUTO: 40.2 % (ref 40–54)
HGB BLD-MCNC: 13.3 G/DL (ref 14–18)
MCH RBC QN AUTO: 33.2 PG (ref 27–34)
MCHC RBC AUTO-ENTMCNC: 33.1 G/DL (ref 32–36)
MCV RBC AUTO: 100 FL (ref 80–100)
PLATELET # BLD AUTO: 191 THOU/UL (ref 140–440)
PMV BLD AUTO: 8.1 FL (ref 7–10)
PROT SERPL-MCNC: 6.2 G/DL (ref 6–8)
RBC # BLD AUTO: 4.01 MILL/UL (ref 4.4–6.2)
VALPROATE SERPL-MCNC: 53.7 UG/ML (ref 50–150)
WBC: 6.3 THOU/UL (ref 4–11)

## 2020-11-06 ENCOUNTER — COMMUNICATION - HEALTHEAST (OUTPATIENT)
Dept: FAMILY MEDICINE | Facility: CLINIC | Age: 85
End: 2020-11-06

## 2020-11-06 LAB
SPECIMEN STATUS: NORMAL
VALPROATE FREE SERPL-MCNC: 6.5 UG/ML (ref 6–20)

## 2020-12-01 ENCOUNTER — RECORDS - HEALTHEAST (OUTPATIENT)
Dept: ADMINISTRATIVE | Facility: OTHER | Age: 85
End: 2020-12-01

## 2020-12-01 ENCOUNTER — OFFICE VISIT (OUTPATIENT)
Dept: NEUROLOGY | Facility: CLINIC | Age: 85
End: 2020-12-01
Payer: MEDICARE

## 2020-12-01 VITALS
HEIGHT: 68 IN | HEART RATE: 75 BPM | WEIGHT: 176 LBS | BODY MASS INDEX: 26.67 KG/M2 | SYSTOLIC BLOOD PRESSURE: 115 MMHG | DIASTOLIC BLOOD PRESSURE: 69 MMHG

## 2020-12-01 DIAGNOSIS — G40.209 PARTIAL EPILEPSY WITH IMPAIRMENT OF CONSCIOUSNESS (H): ICD-10-CM

## 2020-12-01 DIAGNOSIS — G60.9 IDIOPATHIC POLYNEUROPATHY: ICD-10-CM

## 2020-12-01 DIAGNOSIS — H90.0 CONDUCTIVE HEARING LOSS, BILATERAL: ICD-10-CM

## 2020-12-01 DIAGNOSIS — M25.512 CHRONIC PAIN OF BOTH SHOULDERS: ICD-10-CM

## 2020-12-01 DIAGNOSIS — R41.3 MEMORY LOSS: Primary | ICD-10-CM

## 2020-12-01 DIAGNOSIS — M25.511 CHRONIC PAIN OF BOTH SHOULDERS: ICD-10-CM

## 2020-12-01 DIAGNOSIS — G89.29 CHRONIC PAIN OF BOTH SHOULDERS: ICD-10-CM

## 2020-12-01 PROCEDURE — 99214 OFFICE O/P EST MOD 30 MIN: CPT | Performed by: PSYCHIATRY & NEUROLOGY

## 2020-12-01 ASSESSMENT — MIFFLIN-ST. JEOR: SCORE: 1452.83

## 2020-12-01 NOTE — PROGRESS NOTES
NEUROLOGY NOTE        Assessment/Plan               Recurrent spells: seizure most likely    Memory difficulty: MOCA 26/30 on 10/28/2020, wife feeling progressing    Tourette syndrome    Anxiety and depression since young age.  We will see Depakote helps if not consider adding Buspar.     Chronic constipation    Essential tremor, mild grade stable, not worsened by new medication Depakote.    Left frozen shoulder and right shoulder pain.  May need therapy and to follow-up with primary.    Hard of hearing bilaterally with hearing aids which cause ear drainage and skin disruptions    Poor balance, clinical symptoms suggestive of polyneuropathy.        Plan:     Continue Depakote 500 mg daily at bedtime time.    EMG study of bilateral lower extremities    Follow-up with me for EMG study.    Consider work-up for polyneuropathy and for memory.                 SUBJECTIVE         Wayne Carlton is a 86 year old male seen for memory difficulty and spells.       Tolerating Depakote well.  No side effects of medications.  No worsening tremor.  But memory and balance progressing probably going on for 1 year per wife starting the beginning of 2019.  No falls.    During the daytime have spells: Feeling trouble to see things through 1 side of the eye followed by language and speech difficulty lasting for about 30 minutes then recover.     Spell at nighttime: Suddenly feel about the whole body tremors lasting for about 30 minutes to 2 hours, with normal cognitive function.     Hard of hearing: Has hearing aid but causing significant ear drainage and skin disruptions.     Diagnosed with Tourette syndrome with eye twitching on the right side.  Has also start during when very young age.     Essential tremor for a long time: Mild to degree.  The tremor is positional and axonal.     Shoulder pains: Left frozen shoulder very limited range of motion.  Right shoulder having pain but still having adequate range of motion but causing  "pain.     See Dr. Cohn in 2015 for seizure activity versus a TIA,  He has had numerous episodes over the years, usually 1/year, but had 3 spell within last 3 months,  where he will suddenly get difficulty speaking this can last 15 to 45 minutes and then resolve spontaneously.  It usually starts with vision difficulty in the right. Feels weak afterwards for 2-4 hours. His first event was in 2001. He was just seen in the emergency room on August 16 exactly for this.  He had a CTA of the head and neck as well as an MRI of the brain that did not show any acute findings.      He tried keppra and zonegran not able to tolerate.  seizure medication in the past, however he did not tolerate it and did not stay on it long-term.     Memory: not doing well.      EEG 2013 normal.          IMPRESSION: 8/2020  CONCLUSION:   HEAD CT:  1.  No evidence of acute hemorrhage, mass effect, or acute vascular territorial infarction.  2.  Age-related changes as above.     HEAD CTA:   1.  No evidence of proximal large vessel occlusion or flow-limiting stenosis.     NECK CTA:  1.  No evidence of hemodynamically significant stenosis based on NASCET criteria.  2.  No evidence of dissection or pseudoaneurysm.     IMPRESSION: 8/16/20 MRI  1.  No acute infarct, mass, mass effect, or hemorrhage.  2.  Moderate atrophy.  3.  Mild to moderate chronic small vessel ischemia.     8/2020 labs:  CBC, BMP, troponin, INR, PTT and ESR nl in 1 and 8/2020  Had abnormal LFT 12/2019, returned normal 1/2020.      Thiamine level normal.    Blood tests 11/2020  CBC, BMP unremarkable.  Depakote level 53.7, free Depakote level 25.           Review of system     10 point system review otherwise unremarkable    PHYSICAL EXAMINATION     Vital signs in last 24 hours:  Vitals:    12/01/20 1054   BP: 115/69   Pulse: 75   Weight: 79.8 kg (176 lb)   Height: 1.727 m (5' 8\")       Normal mental status, language and speech.  Very pleasant.  Cranial nerves II through XII " significant for very hard of hearing.  Adequate muscle bulk and strength.  Muscle tone appropriate.  Some sensory loss in feet to position and proprioception's.  Very unbalanced walking swimming [id number].  Not particularly wide-based.  Slight swelling in the ankles bilaterally.  Distal pulse present symmetrically.    Hand tremor more on the left side that is position and with target approach.  No sign to suggest Parkinson disease.  To be animated and very spontaneous without bradykinesia or hypokinesia.      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  Fluphenazine-prescribed but patient did not take, a future possibility.       Chronic sinusitis 09/15/2020     Priority: Medium     Longstanding  constant nasal congestion and post-nasal drainage  Tried-numerous sprays, etc       Anxiety 09/15/2020     Priority: Medium     Longstanding  Prior meds       TIA (transient ischemic attack) 07/21/2018     Priority: Medium     Presumed.  See also concern for a partial complex seizures versus TIAs  Treat as TIA  ASA, simvastatin       Rotator cuff tear 07/06/2018     Priority: Medium     Left sided  No surgery       DDD (degenerative disc disease), cervical  07/06/2018     Priority: Medium     Conservative           Past medical history     History reviewed. No pertinent surgical history.    Past Medical History:   Diagnosis Date     Essential tremor            Family history     Family History   Problem Relation Age of Onset     Cancer Father          Social history     Social History     Socioeconomic History     Marital status:      Spouse name: Not on file     Number of children: Not on file     Years of education: Not on file     Highest education level: Not on file   Occupational History     Not on file   Social Needs     Financial resource strain: Not on file     Food insecurity     Worry: Not on file     Inability: Not on file     Transportation needs     Medical: Not on file     Non-medical: Not on file   Tobacco Use     Smoking status: Never Smoker     Smokeless tobacco: Never Used   Substance and Sexual Activity     Alcohol use: Not Currently     Drug use: Not on file     Sexual activity: Not on file   Lifestyle     Physical activity     Days per week: Not on file     Minutes per session: Not on file     Stress: Not on file   Relationships     Social connections     Talks on phone: Not on file     Gets together: Not on file     Attends Mu-ism service: Not on file     Active member of club or organization: Not on file     Attends meetings of clubs or organizations: Not on file     Relationship status: Not on file     Intimate partner violence     Fear of current or ex partner: Not on file     Emotionally abused: Not on file     Physically abused: Not on file     Forced sexual activity: Not on file   Other Topics Concern     Parent/sibling w/ CABG, MI or angioplasty before 65F 55M? Not Asked   Social History Narrative     Not on file         Allergy     Sulfa drugs    MEDICATIONS List     Current Outpatient Medications   Medication Sig Dispense Refill     aspirin 81 MG EC tablet Take 81 mg by mouth daily       divalproex sodium extended-release  (DEPAKOTE ER) 500 MG 24 hr tablet Take 1 tablet (500 mg) by mouth daily 30 tablet 3     docusate sodium (COLACE) 100 MG tablet Take 100 mg by mouth       melatonin (MELATONIN MAXIMUM STRENGTH) 5 MG tablet Take 5 mg by mouth       tamsulosin (FLOMAX) 0.4 MG capsule TAKE 1 CAPSULE (0.4 MG TOTAL) BY MOUTH DAILY AFTER SUPPER. ** MUST BEE SEEN FOR FUTURE FILLS                     Mikayla Garcia MD, MD, PhD  Neurology   Office tel: [phone]        This note was dictated using voice recognition software.  Any grammatical or context distortions are unintentional and inherent to the software. The note is tailored to serve physicians for communications among them, who knows what are the most important elements of history taken for disease diagnosis and differentials as well as management plans. Due to time factors, the notes are in general not reviewed before signing. Again due to time factor, follow-up notes often carries over old notes if they are relevant, so most clinic time is dedicated to interviewing with patients and caregivers, on clinical assessment, coordinating care and management.

## 2020-12-01 NOTE — LETTER
12/1/2020         RE: Wayne Carlton  [address] [facility] [id number]        Dear Colleague,    Thank you for referring your patient, Wayne Carlton, to the [facility] [place]. Please see a copy of my visit note below.        NEUROLOGY NOTE        Assessment/Plan               Recurrent spells: seizure most likely    Memory difficulty: MOCA 26/30 on 10/28/2020, wife feeling progressing    Tourette syndrome    Anxiety and depression since young age.  We will see Depakote helps if not consider adding Buspar.     Chronic constipation    Essential tremor, mild grade stable, not worsened by new medication Depakote.    Left frozen shoulder and right shoulder pain.  May need therapy and to follow-up with primary.    Hard of hearing bilaterally with hearing aids which cause ear drainage and skin disruptions    Poor balance, clinical symptoms suggestive of polyneuropathy.        Plan:     Continue Depakote 500 mg daily at bedtime time.    EMG study of bilateral lower extremities    Follow-up with me for EMG study.    Consider work-up for polyneuropathy and for memory.                 SUBJECTIVE         Wayne Carlton is a 86 year old male seen for memory difficulty and spells.       Tolerating Depakote well.  No side effects of medications.  No worsening tremor.  But memory and balance progressing probably going on for 1 year per wife starting the beginning of 2019.  No falls.    During the daytime have spells: Feeling trouble to see things through 1 side of the eye followed by language and speech difficulty lasting for about 30 minutes then recover.     Spell at nighttime: Suddenly feel about the whole body tremors lasting for about 30 minutes to 2 hours, with normal cognitive function.     Hard of hearing: Has hearing aid but causing significant ear drainage and skin disruptions.     Diagnosed with Tourette syndrome with eye twitching on the right side.  Has also start during  when very young age.     Essential tremor for a long time: Mild to degree.  The tremor is positional and axonal.     Shoulder pains: Left frozen shoulder very limited range of motion.  Right shoulder having pain but still having adequate range of motion but causing pain.     See Dr. Cohn in 2015 for seizure activity versus a TIA,  He has had numerous episodes over the years, usually 1/year, but had 3 spell within last 3 months,  where he will suddenly get difficulty speaking this can last 15 to 45 minutes and then resolve spontaneously.  It usually starts with vision difficulty in the right. Feels weak afterwards for 2-4 hours. His first event was in 2001. He was just seen in the emergency room on August 16 exactly for this.  He had a CTA of the head and neck as well as an MRI of the brain that did not show any acute findings.      He tried keppra and zonegran not able to tolerate.  seizure medication in the past, however he did not tolerate it and did not stay on it long-term.     Memory: not doing well.      EEG 2013 normal.          IMPRESSION: 8/2020  CONCLUSION:   HEAD CT:  1.  No evidence of acute hemorrhage, mass effect, or acute vascular territorial infarction.  2.  Age-related changes as above.     HEAD CTA:   1.  No evidence of proximal large vessel occlusion or flow-limiting stenosis.     NECK CTA:  1.  No evidence of hemodynamically significant stenosis based on NASCET criteria.  2.  No evidence of dissection or pseudoaneurysm.     IMPRESSION: 8/16/20 MRI  1.  No acute infarct, mass, mass effect, or hemorrhage.  2.  Moderate atrophy.  3.  Mild to moderate chronic small vessel ischemia.     8/2020 labs:  CBC, BMP, troponin, INR, PTT and ESR nl in 1 and 8/2020  Had abnormal LFT 12/2019, returned normal 1/2020.      Thiamine level normal.    Blood tests 11/2020  CBC, BMP unremarkable.  Depakote level 53.7, free Depakote level 25.           Review of system     10 point system review otherwise  "unremarkable    PHYSICAL EXAMINATION     Vital signs in last 24 hours:  Vitals:    12/01/20 1054   BP: 115/69   Pulse: 75   Weight: 79.8 kg (176 lb)   Height: 1.727 m (5' 8\")       Normal mental status, language and speech.  Very pleasant.  Cranial nerves II through XII significant for very hard of hearing.  Adequate muscle bulk and strength.  Muscle tone appropriate.  Some sensory loss in feet to position and proprioception's.  Very unbalanced walking swimming [id number].  Not particularly wide-based.  Slight swelling in the ankles bilaterally.  Distal pulse present symmetrically.    Hand tremor more on the left side that is position and with target approach.  No sign to suggest Parkinson disease.  To be animated and very spontaneous without bradykinesia or hypokinesia.      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  Fluphenazine-prescribed but patient did not take, a future possibility.       Chronic sinusitis 09/15/2020     Priority: Medium     Longstanding  constant nasal congestion and post-nasal drainage  Tried-numerous sprays, etc       Anxiety 09/15/2020     Priority: Medium     Longstanding  Prior meds       TIA (transient ischemic attack) " 07/21/2018     Priority: Medium     Presumed.  See also concern for a partial complex seizures versus TIAs  Treat as TIA  ASA, simvastatin       Rotator cuff tear 07/06/2018     Priority: Medium     Left sided  No surgery       DDD (degenerative disc disease), cervical 07/06/2018     Priority: Medium     Conservative           Past medical history     History reviewed. No pertinent surgical history.    Past Medical History:   Diagnosis Date     Essential tremor            Family history     Family History   Problem Relation Age of Onset     Cancer Father          Social history     Social History     Socioeconomic History     Marital status:      Spouse name: Not on file     Number of children: Not on file     Years of education: Not on file     Highest education level: Not on file   Occupational History     Not on file   Social Needs     Financial resource strain: Not on file     Food insecurity     Worry: Not on file     Inability: Not on file     Transportation needs     Medical: Not on file     Non-medical: Not on file   Tobacco Use     Smoking status: Never Smoker     Smokeless tobacco: Never Used   Substance and Sexual Activity     Alcohol use: Not Currently     Drug use: Not on file     Sexual activity: Not on file   Lifestyle     Physical activity     Days per week: Not on file     Minutes per session: Not on file     Stress: Not on file   Relationships     Social connections     Talks on phone: Not on file     Gets together: Not on file     Attends Oriental orthodox service: Not on file     Active member of club or organization: Not on file     Attends meetings of clubs or organizations: Not on file     Relationship status: Not on file     Intimate partner violence     Fear of current or ex partner: Not on file     Emotionally abused: Not on file     Physically abused: Not on file     Forced sexual activity: Not on file   Other Topics Concern     Parent/sibling w/ CABG, MI or angioplasty before 65F 55M? Not  Asked   Social History Narrative     Not on file         Allergy     Sulfa drugs    MEDICATIONS List     Current Outpatient Medications   Medication Sig Dispense Refill     aspirin 81 MG EC tablet Take 81 mg by mouth daily       divalproex sodium extended-release (DEPAKOTE ER) 500 MG 24 hr tablet Take 1 tablet (500 mg) by mouth daily 30 tablet 3     docusate sodium (COLACE) 100 MG tablet Take 100 mg by mouth       melatonin (MELATONIN MAXIMUM STRENGTH) 5 MG tablet Take 5 mg by mouth       tamsulosin (FLOMAX) 0.4 MG capsule TAKE 1 CAPSULE (0.4 MG TOTAL) BY MOUTH DAILY AFTER SUPPER. ** MUST BEE SEEN FOR FUTURE FILLS                     Mikayla Garcia MD, MD, PhD  Neurology   Office tel: [phone]        This note was dictated using voice recognition software.  Any grammatical or context distortions are unintentional and inherent to the software. The note is tailored to serve physicians for communications among them, who knows what are the most important elements of history taken for disease diagnosis and differentials as well as management plans. Due to time factors, the notes are in general not reviewed before signing. Again due to time factor, follow-up notes often carries over old notes if they are relevant, so most clinic time is dedicated to interviewing with patients and caregivers, on clinical assessment, coordinating care and management.         Again, thank you for allowing me to participate in the care of your patient.        Sincerely,        Mikayla Garcia MD

## 2020-12-23 ENCOUNTER — COMMUNICATION - HEALTHEAST (OUTPATIENT)
Dept: EMERGENCY MEDICINE | Facility: CLINIC | Age: 85
End: 2020-12-23

## 2020-12-24 ENCOUNTER — COMMUNICATION - HEALTHEAST (OUTPATIENT)
Dept: EMERGENCY MEDICINE | Facility: CLINIC | Age: 85
End: 2020-12-24

## 2020-12-29 ENCOUNTER — COMMUNICATION - HEALTHEAST (OUTPATIENT)
Dept: SCHEDULING | Facility: CLINIC | Age: 85
End: 2020-12-29

## 2021-01-06 ENCOUNTER — COMMUNICATION - HEALTHEAST (OUTPATIENT)
Dept: NURSING | Facility: CLINIC | Age: 86
End: 2021-01-06

## 2021-01-06 ENCOUNTER — AMBULATORY - HEALTHEAST (OUTPATIENT)
Dept: CARE COORDINATION | Facility: CLINIC | Age: 86
End: 2021-01-06

## 2021-01-06 DIAGNOSIS — U07.1 2019 NOVEL CORONAVIRUS DISEASE (COVID-19): ICD-10-CM

## 2021-01-07 ENCOUNTER — COMMUNICATION - HEALTHEAST (OUTPATIENT)
Dept: CARE COORDINATION | Facility: CLINIC | Age: 86
End: 2021-01-07

## 2021-02-05 ENCOUNTER — COMMUNICATION - HEALTHEAST (OUTPATIENT)
Dept: CARE COORDINATION | Facility: CLINIC | Age: 86
End: 2021-02-05

## 2021-02-08 ENCOUNTER — COMMUNICATION - HEALTHEAST (OUTPATIENT)
Dept: CARE COORDINATION | Facility: CLINIC | Age: 86
End: 2021-02-08

## 2021-02-08 ENCOUNTER — COMMUNICATION - HEALTHEAST (OUTPATIENT)
Dept: INTERNAL MEDICINE | Facility: CLINIC | Age: 86
End: 2021-02-08

## 2021-05-28 NOTE — TELEPHONE ENCOUNTER
Has he tried the magnesium citrate?  I did advise that he try that he could actually use that once a day for 2 to 3 days in a row if needed.  I will check with our specialty  about the referral.

## 2021-05-28 NOTE — TELEPHONE ENCOUNTER
Last bm yesterday/constipated, the stools are hard  Says he is taking stool softeners  He tried miralax  Main concern now is that when he goes at the end of the stool 3-4 inches is almost black for weeks and weeks. He never mentioned the black stool  He strains to get the stool out and has to keep pushing for it to come all they way   Pt is weak at times and can feel his limbs getting weaker  Taking felix seeds and olive oil  He says he tried thinks and than it stops working  He takes so many things that he doesn't know what to take but he is concerned about the black stool  He wants a man doctor for this he tells me  Every time he uses something it stops working  He is eating fiber     He wants to know about the black in the stool? ER?      Day Agrawal, RN [facility] RN Triage      Reason for Disposition    Bloody, black, or tarry bowel movements    Protocols used: RECTAL BLEEDING-A-[place]

## 2021-05-28 NOTE — TELEPHONE ENCOUNTER
Pt was called to relay message from Dr. Colon. Pt agrees with plan and will go get 3 bottles of Magnesium Citrate, and take one tonight to see if it works. Advised pt to call back if he has any further questions, or if it does not start working by tomorrow.     Eber Reynolds, RN [facility] Triage/Medication Refill

## 2021-05-28 NOTE — TELEPHONE ENCOUNTER
Pt is calling in about hard stools, was seen in the office on Friday, and has tried everything he was told, and still cannot have a BM. Discussed home care measures, and pt stated he has tried them all. Pt was told a specialist would call him, but he has not been contacted yet.   Per protocol pt needs to be seen within 3 days, but pt declined to schedule an appointment, and says if it gets bad again he will go to the ER.  Pt would like to get in to see a specialist as soon as possible.     Provider please advise.     Eber Reynolds, RN [facility] Triage/Medication Refill    Reason for Disposition    Unable to have a bowel movement (BM) without manually removing stool (using finger to pull out stool or perform disimpaction)    Protocols used: CONSTIPATION-A-[place]

## 2021-05-28 NOTE — PROGRESS NOTES
ASSESSMENT:  1. Constipation  Chronic long-standing has been difficult to help maintain regular bowel habits.  Presume either functional or from IBD.  - HM2(CBC w/o Differential)  - Ambulatory referral to Gastroenterology    2. Black stool  Patient noticed that his stool was black recently hemoglobin stable at 13.9, I am not suspicious for internal bleeding.  - HM2(CBC w/o Differential)        PLAN:  1.  CBC, results to be conveyed to the patient with reassurance.  2.  Discussed with the patient that there is unlikely to be some type of simple easy solution, he can take MiraLAX and or other fiber supplements daily, for more difficult constipation magnesium citrate.  3.  Referral to gastroenterology.  4.  6-month follow-up see earlier as needed.    Orders Placed This Encounter   Procedures     HM2(CBC w/o Differential)     Ambulatory referral to Gastroenterology     Referral Priority:   Routine     Referral Type:   Consultation     Referral Reason:   Evaluation and Treatment     Requested Specialty:   Gastroenterology     Number of Visits Requested:   1     Medications Discontinued During This Encounter   Medication Reason     hydrocortisone (ANUSOL-HC) 2.5 % rectal cream Therapy completed     fluPHENAZine (PROLIXIN) 1 MG tablet Therapy completed     hydrocortisone 2.5 % ointment Therapy completed     HYDROcodone-acetaminophen 5-325 mg per tablet Therapy completed       Return in about 1 day (around 5/11/2019) for Return to clinic if symptoms persist or worsen.    CHIEF COMPLAINT:  Chief Complaint   Patient presents with     Constipation     c/o bowel issues that patient says he has had off and on since he was 15 (see triage note)        SUBJECTIVE:  Wayne is a 85 y.o. male who presents with constipation issues. Patient explains that he has been dealing with intermittent constipation since he was a teenager. He has a very tough time having bowel movements and he has to strain. Yesterday he took milk of magnesia and  "Miralax which were effective. He was able to have a soft bowel movement for the first time in a long time. However the last four inches of his stool were black. He has tried various things in the past for his constipation which seem to work briefly but then his constipation issues return. He has tried increasing his water intake which does not seem to make a difference. He notes that his stool looks \"jagged and broken\" when he has a bowel movement. He has also had some anal leakage.     REVIEW OF SYSTEMS:   Patient complains of constipation.  All other systems are negative.    PFSH:  Immunization History   Administered Date(s) Administered     Influenza high dose, seasonal 10/22/2018     Influenza, Seasonal, Inj PF IIV3 09/24/2009     Influenza, inj, historic,unspecified 08/28/2010     Pneumo Conj 13-V (2010&after) 07/06/2018     Pneumo Polysac 23-V 08/16/2000     Td,adult,historic,unspecified 08/16/2000, 01/07/2011     Social History     Socioeconomic History     Marital status:      Spouse name: Not on file     Number of children: 2     Years of education: Not on file     Highest education level: Not on file   Occupational History     Occupation: Garbage route     Comment: Retired   Social Needs     Financial resource strain: Not on file     Food insecurity:     Worry: Not on file     Inability: Not on file     Transportation needs:     Medical: Not on file     Non-medical: Not on file   Tobacco Use     Smoking status: Never Smoker     Smokeless tobacco: Never Used   Substance and Sexual Activity     Alcohol use: No     Comment: Never an issue, he states. 2/6/17     Drug use: No     Sexual activity: Yes     Partners: Female   Lifestyle     Physical activity:     Days per week: Not on file     Minutes per session: Not on file     Stress: Not on file   Relationships     Social connections:     Talks on phone: Not on file     Gets together: Not on file     Attends Rastafarian service: Not on file     Active " member of club or organization: Not on file     Attends meetings of clubs or organizations: Not on file     Relationship status: Not on file     Intimate partner violence:     Fear of current or ex partner: Not on file     Emotionally abused: Not on file     Physically abused: Not on file     Forced sexual activity: Not on file   Other Topics Concern     Not on file   Social History Narrative    Diet-eat healthy        Exercise-not regular        2nd marriage     History reviewed. No pertinent past medical history.  Family History   Problem Relation Age of Onset     Jaundice Mother          at 97, with jaundice.     Leukemia Father          at 47 - sounds like AML     Heart disease Brother          of a heart attack.     Hypertension Brother      Blindness Brother         Unilateral blindness, variable for 6 weeks as of 17       MEDICATIONS:  Current Outpatient Medications   Medication Sig Dispense Refill     aspirin 81 MG EC tablet Take 1 tablet (81 mg total) by mouth daily.  0     docusate sodium (STOOL SOFTENER) 100 mg capsule Take 100 mg by mouth daily as needed for constipation.        melatonin 5 mg Tab Take 5 mg by mouth at bedtime.        simvastatin (ZOCOR) 20 MG tablet Take 1 tablet (20 mg total) by mouth at bedtime. 90 tablet 3     tamsulosin (FLOMAX) 0.4 mg cap TAKE 1 CAPSULE (0.4 MG TOTAL) BY MOUTH DAILY AFTER SUPPER. 90 capsule 3     No current facility-administered medications for this visit.        TOBACCO USE:  Social History     Tobacco Use   Smoking Status Never Smoker   Smokeless Tobacco Never Used       VITALS:  Vitals:    05/10/19 0852   BP: 119/68   Pulse: 76   SpO2: 98%   Weight: 182 lb 6.4 oz (82.7 kg)     Wt Readings from Last 3 Encounters:   05/10/19 182 lb 6.4 oz (82.7 kg)   10/22/18 181 lb (82.1 kg)   18 180 lb 1.6 oz (81.7 kg)       PHYSICAL EXAM:  Constitutional:   Reveals a healthy appearing male.  Vitals: per nursing notes.  HEENT:  Ears:  External canals, TMs  clear.    Eyes:  EOMs full, PERRL.  Lungs: Clear to A&P without rales or wheezes.  Respiratory effort normal.  Cardiac:   Regular rate and rhythm, normal S1, S2, no murmur or gallop.  Musculoskeletal: No peripheral swelling.  Neuro:  Alert and oriented. Cranial nerves, motor, sensory exams are intact.  No gross focal deficits.  Psychiatric:  Memory intact, mood appropriate.    QUALITY MEASURES:      DATA REVIEWED:  Additional History from Old Records Summarized (2):   Decision to Obtain Records (1):   Radiology Tests Summarized or Ordered (1):   Labs Reviewed or Ordered (1): Ordered labs.   Medicine Test Summarized or Ordered (1):   Independent Review of EKG, X-RAY, or RAPID STREP (2 each):     The visit lasted a total of 22 minutes face to face with the patient. Over 50% of the time was spent counseling and educating the patient about his bowel issues.    By signing my name below, I, Isaías Lopez, attest that this documentation has been prepared under the direction and in the presence of Dr. Zac Colon.  Electronic Signature: Martita Harmon. 5/10/2019 8:52 AM.    I, Dr. Colon, personally performed the services described in this documentation. All medical record entries made by the scribe were at my direction and in my presence. I have reviewed the chart and discharge instructions (if applicable) and agree that the record reflects my personal performance and is accurate and complete.      Total data points: 1

## 2021-05-28 NOTE — TELEPHONE ENCOUNTER
Patient notified of clinician's message and verbalized understanding. No further questions at this time. Appointment scheduled for tomorrow 5/10/19.  Anisha Peters CMA ............... 3:29 PM, 05/09/19

## 2021-05-28 NOTE — TELEPHONE ENCOUNTER
"Pt calls to report frustration with persistent constipation.  States \"I drink all kinds of water and take laxatives.\"  \"Drank mineral oil yesterday.\"    \"My whole stool is hard -> \"A foot or more and totally hard.\"  Typical oral intake includes:  - oatmeal  - sandwich with meat and cheese  - apples, tangerines    \"Used to drink prune juice which worked well, but then stopped.\"  Pt states \"It seems like my bowels don't want to move.\"  \"I strain and push.\"  Advised pt to return to prune juice 3x daily, anastasia when eating meats and cheese.    Pt states \"I never did go to [facility].\"  \"Things got better for awhile, so I cancelled the appt.\"  Offered to have a specialty  to call him again to schedule with [facility].  PCP entered orders 1/11/2019.  However pt declines, stating \"would like to first try prune juice 3x daily and 'see what happens'.\"    Antonette Andersen RN BSBA  [facility] RN Triage     Reason for Disposition    Constipation is a recurrent ongoing problem (i.e., < 3 BMs / week or straining > 25% of the time)    Protocols used: CONSTIPATION-A-OH      "

## 2021-05-30 ENCOUNTER — RECORDS - HEALTHEAST (OUTPATIENT)
Dept: ADMINISTRATIVE | Facility: CLINIC | Age: 86
End: 2021-05-30

## 2021-05-30 VITALS — BODY MASS INDEX: 27.55 KG/M2 | HEIGHT: 69 IN | WEIGHT: 186 LBS

## 2021-05-30 VITALS — WEIGHT: 188 LBS | HEIGHT: 69 IN | BODY MASS INDEX: 27.85 KG/M2

## 2021-06-01 ENCOUNTER — RECORDS - HEALTHEAST (OUTPATIENT)
Dept: ADMINISTRATIVE | Facility: CLINIC | Age: 86
End: 2021-06-01

## 2021-06-01 VITALS — WEIGHT: 180.1 LBS | HEIGHT: 68 IN | BODY MASS INDEX: 27.29 KG/M2

## 2021-06-01 VITALS — BODY MASS INDEX: 26.81 KG/M2 | HEIGHT: 69 IN | WEIGHT: 181 LBS

## 2021-06-01 VITALS — WEIGHT: 180.2 LBS | HEIGHT: 69 IN | BODY MASS INDEX: 26.69 KG/M2

## 2021-06-01 VITALS — WEIGHT: 179 LBS | BODY MASS INDEX: 26.82 KG/M2

## 2021-06-01 VITALS — WEIGHT: 179.5 LBS | BODY MASS INDEX: 26.51 KG/M2

## 2021-06-02 VITALS — BODY MASS INDEX: 27.52 KG/M2 | WEIGHT: 181 LBS

## 2021-06-03 VITALS — BODY MASS INDEX: 27.73 KG/M2 | WEIGHT: 182.4 LBS

## 2021-06-04 VITALS
HEART RATE: 88 BPM | WEIGHT: 181.1 LBS | DIASTOLIC BLOOD PRESSURE: 62 MMHG | SYSTOLIC BLOOD PRESSURE: 98 MMHG | BODY MASS INDEX: 27.54 KG/M2

## 2021-06-05 VITALS
HEART RATE: 85 BPM | WEIGHT: 181 LBS | DIASTOLIC BLOOD PRESSURE: 55 MMHG | SYSTOLIC BLOOD PRESSURE: 107 MMHG | OXYGEN SATURATION: 96 % | BODY MASS INDEX: 27.52 KG/M2

## 2021-06-05 NOTE — TELEPHONE ENCOUNTER
Triage call:   Over the last couple weeks - muscle spasms on the right side of his back and down his side - center to the right side low back   Issues with constipation as well - reports that he is straining a lot when he has to have a BM   Reports that the pain is getting worse and worse and radiates into his groin/scrotum  No pain with urination or blood in his urine   Today reports his pain is really bad - loosens up with movement but very uncomfortable today   Took tylenol yesterday and unsure if helped much     Triaged to be seen in the ER at this time to rule out kidney stones. Reviewed additional care advice with patient and he verbalizes understanding. He will go to [facility] ER at this time with his wife.     Madelyn Morales RN BSBA [facility] Triage/Med Refill 1/27/2020 9:41 AM    Reason for Disposition    Pain radiates into groin, scrotum    Protocols used: BACK PAIN-A-OH

## 2021-06-06 NOTE — TELEPHONE ENCOUNTER
Refill Approved    Rx renewed per Medication Renewal Policy. Medication was last renewed on 1/17/19.    Haydee Luo, [facility] Triage/Med Refill 3/14/2020     Requested Prescriptions   Pending Prescriptions Disp Refills     tamsulosin (FLOMAX) 0.4 mg cap [Pharmacy Med Name: TAMSULOSIN HCL 0.4 MG CAPSULE] 90 capsule 3     Sig: TAKE 1 CAPSULE (0.4 MG TOTAL) BY MOUTH DAILY AFTER SUPPER.       Alfuzosin/Tamsulosin/Silodosin Refill Protocol  Passed - 3/12/2020  4:51 AM        Passed - PCP or prescribing provider visit in past 12 months       Last office visit with prescriber/PCP: 5/10/2019 Zac Colon MD OR same dept: 5/10/2019 Zac Colon MD OR same specialty: 5/10/2019 Zac Colon MD  Last physical: Visit date not found Last MTM visit: Visit date not found   Next visit within 3 mo: Visit date not found  Next physical within 3 mo: Visit date not found  Prescriber OR PCP: Zac Colon MD  Last diagnosis associated with med order: 1. Benign prostatic hyperplasia without lower urinary tract symptoms  - tamsulosin (FLOMAX) 0.4 mg cap [Pharmacy Med Name: TAMSULOSIN HCL 0.4 MG CAPSULE]; TAKE 1 CAPSULE (0.4 MG TOTAL) BY MOUTH DAILY AFTER SUPPER.  Dispense: 90 capsule; Refill: 3    If protocol passes may refill for 12 months if within 3 months of last provider visit (or a total of 15 months).

## 2021-06-07 NOTE — TELEPHONE ENCOUNTER
Having diarrhea since last Wednesday.    Went 10x within 24 hours first few days, then slowed down.  Not much of an appetite.  Today it was 2 small black stools scared him. Constipation most all his life.    Has only been eating a little bit the past few days.  Has taken pepto yesterday and the day before.  May be why the stool was black today.    Lives with wife and mentally challenged son.    Daughter in law on phone said to call doctor.    Mouth feels wet.  Drinking lots of water.  Trying to keep hydrated.Takes stool softeners.    Has peed 2-3 x today. No abdominal pain.    Only 2 stools so far today.    BRAT diet ok. Starchy is good.Slows down the guts. Washing hand important too.  No fever.    Fluids keeping hydrated.    Call back Wednesday at 1 week if continues.  No fatty diet.  He agrees it seems to be improving.    Shante Renner RN [facility] Triage Nurse Advisor    Reason for Disposition    MILD-MODERATE diarrhea (e.g., 1-6 times / day more than normal)    Protocols used: DIARRHEA-A-OH

## 2021-06-08 NOTE — PROGRESS NOTES
"[facility] Clinic Visit  Patient Name: Wayne Carlton  Patient Age: 83 y.o.  YOB: 1933  MRN: [id number]  ?  Date of Visit: 1/5/2017  Reason for Office Visit:   Chief Complaint   Patient presents with     Hypertension     Bp check      Leg Tremors     On going issue for years. Has never been seen for this before. Wakes up in the middle of the night to use the restroom, and while trying to go back to sleep he can feels the aches and spasms come on. usually lasts for about a half hour. Only happens when laying down in bed.      Constipation     Still having issues with passing a BM. Having issues with soft stools still. Has to strain. Having accidents in underwear.          HPI: Wayne Carlton 83 y.o. male who presents to clinic for a number of issues today:    1. BP check: today is 122/80. Historically he has been very well controlled     2. Leg tremors and hands, worse in legs shaking, ongoing for years. \" call them tremors and convulsions\". Years ago, it would happen once or twice a year, but as time went on has become more frequent on average 2-3 times per month. He wakes up, uses the restroom, he goes back to bed, and will have 30 minutes of shaking arms and legs. No bed wetting, tongue biting or post ictal phase. For past 2 nights this has happened and is concerned. He has a history of Tourette's     3. Constipation getting better with stool softeners and Miralax. Sometimes will have marks in his underwear.        Review of Systems: As noted in HPI     Current Scheduled Meds:  Outpatient Encounter Prescriptions as of 1/5/2017   Medication Sig Dispense Refill     aspirin 81 MG EC tablet Take 81 mg by mouth every other day.        docoshexanoic acid-eicosapent 500 mg (FISH OIL) 500-100 mg cap capsule Take 1,000 mg by mouth 2 (two) times a day.       docusate sodium (STOOL SOFTENER) 100 mg capsule Take 100 mg by mouth as needed for constipation.       hydrocortisone 2.5 % ointment Insert 1 " "application into the rectum as needed.        melatonin 5 mg Tab Take 1 tablet by mouth bedtime.       polyethylene glycol 3350 Powd Use As Directed. 1/2-1 cap full daily       tamsulosin (FLOMAX) 0.4 mg Cp24 Take 0.4 mg by mouth bedtime.       No facility-administered encounter medications on file as of 1/5/2017.      Past Medical History   Diagnosis Date     TIA (transient ischemic attack)      Past Surgical History   Procedure Laterality Date     Pr arthrodesis ant interbody min discectomy,lumbar       Description: Lumbar Vertebral Fusion;  Recorded: 01/06/2012;  Comments: L5 \"spinal graft\"     Pr appendectomy       Description: Appendectomy;  Proc Date: 02/01/2012;  Comments: for appendiceal mucocele. Benign pathology.     Pr hemorrhoidectomy internal rubber band ligations       Description: Hemorrhoidectomy;  Proc Date: 01/01/1973;     Social History   Substance Use Topics     Smoking status: Never Smoker     Smokeless tobacco: None     Alcohol use No       Objective / Physical Examination:  Visit Vitals     /80     Pulse 84     Ht 5' 9\" (1.753 m)     Wt 188 lb (85.3 kg)     BMI 27.76 kg/m2     Wt Readings from Last 3 Encounters:   01/05/17 188 lb (85.3 kg)   08/15/16 184 lb (83.5 kg)   07/09/15 180 lb (81.6 kg)     Body mass index is 27.76 kg/(m^2). (>25?)    General Appearance: Alert and oriented in no acute distress  Lungs: Clear to auscultation bilaterally. Normal inspiratory and expiratory effort.  Cardiovascular: RRR S1, S2.  Abdomen: Bowel sounds active all four quadrants. Soft, non-tender.   Neuro: Alert and oriented, follows commands appropriately. Grossly normal. Cranial nerves II-XII intact and normal. Normal cerebellar finger nose, heel shin. Gait normal     Assessment / Plan / Medical Decision Making:      Encounter Diagnoses   Name Primary?     Convulsions, unspecified convulsion type Yes        Convulsions, unspecified convulsion type    Unclear what these episodes are. They don't sound " like seizures. Maybe a manifestation of tourette's? I will check some basic labs today and place a referral to neuro for a second opinion. A basic neuro exam today was unremarkable. He agrees with this plan      - HM1(CBC and Differential)  - Basic Metabolic Panel  - Magnesium  - CK Total  - Vitamin B12  - Ambulatory referral to Neurology  - HM1 (CBC with Diff)    Constipation: seems better. Continue current treatment. If having too loose of stools cut back on stool softeners     Follow up to establish care in 1 month with the good Dr Trace Gasca     Total time spent with patient was 20 minutes with >50% of time spent in face-to-face counseling regarding the above plan     Isai Torres MD  [facility]

## 2021-06-08 NOTE — TELEPHONE ENCOUNTER
RN cannot approve Refill Request    RN can NOT refill this medication Protocol failed and NO refill given.        Haydee Luo, [facility] Triage/Med Refill 6/12/2020    Requested Prescriptions   Pending Prescriptions Disp Refills     tamsulosin (FLOMAX) 0.4 mg cap [Pharmacy Med Name: TAMSULOSIN HCL 0.4 MG CAPSULE] 90 capsule 3     Sig: TAKE 1 CAPSULE (0.4 MG TOTAL) BY MOUTH DAILY AFTER SUPPER.       Alfuzosin/Tamsulosin/Silodosin Refill Protocol  Failed - 6/11/2020  7:31 AM        Failed - PCP or prescribing provider visit in past 12 months       Last office visit with prescriber/PCP: 5/10/2019 Zac Colon MD OR same dept: Visit date not found OR same specialty: 5/10/2019 Zac Colon MD  Last physical: Visit date not found Last MTM visit: Visit date not found   Next visit within 3 mo: Visit date not found  Next physical within 3 mo: Visit date not found  Prescriber OR PCP: Zac Colon MD  Last diagnosis associated with med order: 1. Benign prostatic hyperplasia without lower urinary tract symptoms  - tamsulosin (FLOMAX) 0.4 mg cap [Pharmacy Med Name: TAMSULOSIN HCL 0.4 MG CAPSULE]; TAKE 1 CAPSULE (0.4 MG TOTAL) BY MOUTH DAILY AFTER SUPPER.  Dispense: 90 capsule; Refill: 0    If protocol passes may refill for 12 months if within 3 months of last provider visit (or a total of 15 months).

## 2021-06-10 NOTE — PROGRESS NOTES
ASSESSMENT:  1. TIA (transient ischemic attack)  Possible, the patient has numerous recurrent episodes of a aphasia that fairly rapidly resolved.  - Ambulatory referral to Neurology    2. 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  - Ambulatory referral to Neurology    3. Memory loss  Check to sensation of decreased memory over time.  - Ambulatory referral to Neurology    4. Constipation  Longstanding history of constipation he does take a variety of stool softeners and tries to drink plenty of fluids.    5. Cheek swelling  Patient is having at least a month history of some itching swelling of the right cheek, it has a cystlike appearance but the etiology is not clear.    6. Tourette's  Also a longstanding issue not currently being treated.        PLAN:  1.  Referral to neurology, if possible I would like neurology to see if they can give a more definitive diagnosis.  2.  For now, the patient and his wife do feel that they are able to maintain their own environment.  3.  I am not adding or changing to any of the patient's medications with therapy for now.  4.  For the right cheek swelling for now watchful waiting, if this persists or worsens I may need to consider dermatology referral.  5.  Follow-up in clinic after neurology evaluation.    Orders Placed This Encounter   Procedures     Ambulatory referral to Neurology     Referral Priority:   Routine     Referral Type:   Consultation     Referral Reason:   Evaluation and Treatment     Requested Specialty:   Neurology     Number of Visits Requested:   1     There are no discontinued medications.    No follow-ups on file.    CHIEF COMPLAINT:  Chief Complaint   Patient presents with     Follow-up       SUBJECTIVE:  Wayne is a 86 y.o. male who comes in with his wife for multiple  concerns.    The patient has a history of either some type of seizure activity versus a TIA, is not been definitively diagnosed which it is.  He has had numerous episodes over the years where he will suddenly get difficulty speaking this can last 15 to 45 minutes and then resolve spontaneously.  He was just seen in the emergency room on August 16 exactly for this.  He had a CTA of the head and neck as well as an MRI of the brain that did not show any acute findings.  It was not recommended he have an an intervention of any kind but it was recommended that he follow-up with neurology.    Of note there is no evidence of any prior stroke from these imaging studies.  The patient is already on a statin and low-dose aspirin, I discussed with the patient that it is important to know if possible what the cause of this is since the treatment of this is actually quite different.  It does sound like the patient was on a seizure medication in the past, however he did not tolerate it and did not stay on it long-term.    Patient has noticed for at least a month perhaps longer some swelling and itching in the right cheek, this was not preceded by any injury or change in his usual level of activity, he is tried some over-the-counter moisturizers but these do not seem to be making much of a difference.    Patient has a longstanding history of Tourette's there is been some discussion of medication treatment for this in the past but he is not on anything at least currently.    Patient has a longstanding history also of constipation he does drink plenty of water throughout the day, he has been on various interventions including MiraLAX and Colace, at times he still does have ongoing straining of his stools.          REVIEW OF SYSTEMS:      All other systems are negative.    PFSH:  Immunization History   Administered Date(s) Administered     Influenza high dose,seasonal,PF, 65+ yrs 10/22/2018     Influenza, Seasonal, Inj PF IIV3  2009     Influenza, inj, historic,unspecified 2010, 2019     Pneumo Conj 13-V (2010&after) 2018     Pneumo Polysac 23-V 2000     Td,adult,historic,unspecified 2000, 2011     Social History     Socioeconomic History     Marital status:      Spouse name: Not on file     Number of children: 2     Years of education: Not on file     Highest education level: Not on file   Occupational History     Occupation: Garbage route     Comment: Retired   Social Needs     Financial resource strain: Not on file     Food insecurity     Worry: Not on file     Inability: Not on file     Transportation needs     Medical: Not on file     Non-medical: Not on file   Tobacco Use     Smoking status: Never Smoker     Smokeless tobacco: Never Used   Substance and Sexual Activity     Alcohol use: No     Comment: Never an issue, he states. 17     Drug use: No     Sexual activity: Yes     Partners: Female   Lifestyle     Physical activity     Days per week: Not on file     Minutes per session: Not on file     Stress: Not on file   Relationships     Social connections     Talks on phone: Not on file     Gets together: Not on file     Attends [employer] service: Not on file     Active member of club or organization: Not on file     Attends meetings of clubs or organizations: Not on file     Relationship status: Not on file     Intimate partner violence     Fear of current or ex partner: Not on file     Emotionally abused: Not on file     Physically abused: Not on file     Forced sexual activity: Not on file   Other Topics Concern     Not on file   Social History Narrative    Diet-eat healthy        Exercise-not regular        Live in Home        2nd marriage     No past medical history on file.  Family History   Problem Relation Age of Onset     Jaundice Mother          at 97, with jaundice.     Leukemia Father          at 47 - sounds like AML     Heart disease Brother          of a  heart attack.     Hypertension Brother      Blindness Brother         Unilateral blindness, variable for 6 weeks as of 2/6/17       MEDICATIONS:  Current Outpatient Medications   Medication Sig Dispense Refill     aspirin 81 MG EC tablet Take 1 tablet (81 mg total) by mouth daily.  0     docusate sodium (STOOL SOFTENER) 100 mg capsule Take 100 mg by mouth daily as needed for constipation.        melatonin 5 mg Tab Take 5 mg by mouth at bedtime.        simvastatin (ZOCOR) 20 MG tablet Take 1 tablet (20 mg total) by mouth at bedtime. 90 tablet 3     tamsulosin (FLOMAX) 0.4 mg cap TAKE 1 CAPSULE (0.4 MG TOTAL) BY MOUTH DAILY AFTER SUPPER. 90 capsule 0     No current facility-administered medications for this visit.        TOBACCO USE:  Social History     Tobacco Use   Smoking Status Never Smoker   Smokeless Tobacco Never Used       VITALS:  Vitals:    08/25/20 1528   BP: 98/62   Pulse: 88   Weight: 181 lb 1.6 oz (82.1 kg)     Wt Readings from Last 3 Encounters:   08/25/20 181 lb 1.6 oz (82.1 kg)   08/16/20 185 lb 4.8 oz (84.1 kg)   01/27/20 175 lb (79.4 kg)       PHYSICAL EXAM:  Constitutional:   Reveals a male who looks his stated age.  Vitals: per nursing notes.  HEENT: There is about a 4 x 8 area of redness raised swelling but not warm to the touch area on the right cheek it does feel a bit drier course.    Eyes:  EOMs full, PERRL.  Musculoskeletal: No peripheral swelling.  Neuro:  Alert and oriented. Cranial nerves, motor, sensory exams are intact.  No gross focal deficits.  Psychiatric:  Memory intact, mood appropriate.    QUALITY MEASURES:      DATA REVIEWED:

## 2021-06-11 NOTE — PROGRESS NOTES
Clinic Note    Assessment:     Assessment and Plan:  1. Strain of abdominal muscle  His pain seems to be muscular in nature.  Normal breath sounds.  No pain with palpation to the costal margins.  I do not think he needs a chest x-ray or rib x-ray today.  Orozco sign negative    2. Constipation, unspecified constipation type  See patient instructions below for plan of care.       Patient Instructions   I suspect that you injured 1 of your abdominal muscles.    Fortunately, you do not have any tenderness in your rib cage.    Your lung sounds are normal.    You do not have evidence of liver damage.    I think we can hold off on any further testing right now.    Tylenol for pain.  Follow the instructions on the label.    For your constipation, I would use MiraLAX daily.  You can titrate up on your MiraLAX, as needed.    Follow-up if your abdominal/rib pain is worsening over the next few days.  It may take 2 weeks or longer for your pain to subside completely.             Subjective:      Wayne Carlton is a 86 y.o. male who comes the clinic today for some right upper quadrant abdominal pain.    Yesterday, he was swatting at an insect in his house when he tripped and fell over an ottoman.  He landed on the couch and impaled his right upper quadrant on the furniture.    He was a bit tender last night and says that he is been feeling mildly better today.    No shortness of breath or chest pain.  He localizes the pain to his inferior costal margin.  His ribs seem to be unaffected.    He has not noticed any bruising or evidence of bleeding.    He is able to take deep breaths without issue.    History of cholecystectomy.  He does have a chronic issue with constipation.  He has not been able to have normal bowel movements recently.    No nausea or vomiting.    The following portions of the patient's history were reviewed and updated as appropriate: Allergies, medications, problems, prior note.    Review of Systems:    Review  is otherwise negative except for what is mentioned above.     Social Hx:    Social History     Tobacco Use   Smoking Status Never Smoker   Smokeless Tobacco Never Used         Objective:     Vitals:    09/23/20 1334   BP: 107/55   Pulse: 85   SpO2: 96%   Weight: 181 lb (82.1 kg)       Exam:    General: No apparent distress. Calm. Alert and Oriented X3. Pt behavior is appropriate.  Chest/Lungs: Normal chest wall, clear to auscultation, normal respiratory effort and rate.   Heart/Pulses: Regular rate and rhythm, strong and equal radial pulses, no murmurs. Capillary refill <2 seconds. No edema.   Abdomen: Soft, no palpable masses. No hepatosplenomegaly, no tenderness with palpation noted. Bowel sounds active in all quadrants. No increased tympany.   Genitalia: Not examined.   Musculoskeletal: No CVA tenderness with palpation. Good ROM with extremities.   Neurologic: Interactive, alert, no focal findings, CNs intact.   Skin: Warm, dry. Normal hair pattern. Free of lesions. Normal skin turgor.       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  0     docusate sodium (STOOL SOFTENER) 100 mg capsule Take 100 mg by mouth daily as needed for constipation.        melatonin 5 mg Tab Take 5 mg by mouth at bedtime.        tamsulosin (FLOMAX) 0.4 mg cap TAKE 1 CAPSULE (0.4 MG TOTAL) BY MOUTH DAILY AFTER SUPPER. ** MUST BEE SEEN FOR FUTURE FILLS 90 capsule 3     simvastatin (ZOCOR) 20 MG tablet Take 1 tablet (20 mg total) by mouth at bedtime. 90 tablet 3     No current facility-administered  medications for this visit.            Dakota Beltre (Rob), ROJAS    9/23/2020

## 2021-06-11 NOTE — TELEPHONE ENCOUNTER
RN Triage:   Constipated for 50 years.   Asking when to take a laxative, what time of day is best?  Last BM was last night. Taking docusate and olive oil. He uses Miralax also.  He was advised drink water. He sometimes has bleeding after BM on toilet paper. Advised an appointment. He did not want to schedule.         Mariaa Delarosa RN, BSN [facility] Triage Nurse      Reason for Disposition    Uses laxative (e.g., PEG / Miralax. milk of magnesia) or enema more than once a month    Minor bleeding from rectum (e.g., blood just on toilet paper, few drops, streaks on surface of normal formed BM) occurs more than twice    Constipation is a recurrent ongoing problem (i.e., < 3 BMs / week or straining > 25% of the time)    Additional Information    Negative: Abdomen pain is the main symptom and adult male    Negative: Abdomen pain is the main symptom and adult female    Negative: Rectal bleeding or blood in stool is the main symptom    Negative: Patient sounds very sick or weak to the triager    Negative: Constant abdominal pain lasting > 2 hours    Negative: Vomiting bile (green color)    Negative: Vomiting and abdomen looks much more swollen than usual    Negative: Unable to have a bowel movement (BM) without manually removing stool (using finger to pull out stool or perform disimpaction)    Negative: Unable to have a bowel movement (BM) without using a laxative, suppository, or enema    Negative: Constipation persists > 1 week and no improvement after using CARE ADVICE    Negative: Weight loss greater than 10 pounds (5 kg) and not dieting    Negative: Pencil-like, narrow stools    Negative: Patient wants to be seen    Negative: Rectal pain or fullness from fecal impaction (rectum full of stool) and NOT better after SITZ bath, suppository or enema    Negative: Abdomen is more swollen than usual    Negative: Last bowel movement (BM) > 4 days ago    Negative: Leaking stool    Negative: Intermittent mild abdominal pain  and fever    Protocols used: CONSTIPATION-A-OH

## 2021-06-11 NOTE — PATIENT INSTRUCTIONS - HE
I suspect that you injured 1 of your abdominal muscles.    Fortunately, you do not have any tenderness in your rib cage.    Your lung sounds are normal.    You do not have evidence of liver damage.    I think we can hold off on any further testing right now.    Tylenol for pain.  Follow the instructions on the label.    For your constipation, I would use MiraLAX daily.  You can titrate up on your MiraLAX, as needed.    Follow-up if your abdominal/rib pain is worsening over the next few days.  It may take 2 weeks or longer for your pain to subside completely.

## 2021-06-11 NOTE — TELEPHONE ENCOUNTER
"Patient calling reporting falling yesterday late afternoon. Patient fell onto side of couch hitting chest. Reporting pain in rib cage with \"certain positions.\" Reporting pain is mild during triage when sitting at rest. Denies visible swelling or bruising.   Pain waking intermittent during the night. Patient has not taken anything for pain today.  Per guidelines advised to be seen in clinic today.  Transferred to Central Scheduling.    Tricia Barrera RN  [facility]    COVID 19 Nurse Triage Plan/Patient Instructions    Please be aware that novel coronavirus (COVID-19) may be circulating in the community. If you develop symptoms such as fever, cough, or SOB or if you have concerns about the presence of another infection including coronavirus (COVID-19), please contact your health care provider or visit [email or web address].     Disposition/Instructions    In-Person Visit with provider recommended. Reference Visit Selection Guide.    Thank you for taking steps to prevent the spread of this virus.  o Limit your contact with others.  o Wear a simple mask to cover your cough.  o Wash your hands well and often.    Resources    [facility]: About COVID-19: [email or web address]    CDC: What to Do If You're Sick: www.cdc.gov/coronavirus/2019-ncov/about/steps-when-sick.html    CDC: Ending Home Isolation: www.cdc.gov/coronavirus/2019-ncov/hcp/disposition-in-home-patients.html     CDC: Caring for Someone: www.cdc.gov/coronavirus/2019-ncov/if-you-are-sick/care-for-someone.html     [facility]: Interim Guidance for Hospital Discharge to Home: www.health.[email or web address].[email or web address]/diseases/coronavirus/hcp/hospdischarge.pdf    [facility] clinical trials (COVID-19 research studies): [email or web address]-trials     Below are the COVID-19 hotlines at the [place] Department of Health ([facility]). Interpreters are available.   o For health questions: Call 441-120-9155 or 1-825.327.1655 (7 a.m. to 7 " p.m.)  o For questions about schools and childcare: Call 062-141-5492 or 1-442.649.2746 (7 a.m. to 7 p.m.)      Reason for Disposition    High-risk adult (e.g., age > 60, osteoporosis, chronic steroid use)    Additional Information    Negative: Major injury from dangerous force or speed (e.g., MVA, fall > 10 feet or 3 meters)    Negative: Bullet wound, knife wound or other penetrating object    Negative: Puncture wound that sounds life-threatening to the triager    Negative: SEVERE difficulty breathing (e.g., struggling for each breath, speaks in single words)    Negative: Major bleeding (actively dripping or spurting) that can't be stopped    Negative: Open wound of the chest with sound of moving air (sucking wound) or visible air bubbles    Negative: Shock suspected (e.g., cold/pale/clammy skin, too weak to stand, low BP, rapid pulse)    Negative: Coughing or spitting up blood    Negative: Bluish (or gray) lips or face    Negative: Unconscious or was unconscious    Negative: Sounds like a life-threatening emergency to the triager    Negative: SEVERE chest pain    Negative: Difficulty breathing and not severe    Negative: Skin is split open or gaping (or length > 1/2 inch or 12 mm)    Negative: Bleeding won't stop after 10 minutes of direct pressure (using correct technique)    Negative: Sounds like a serious injury to the triager    Negative: Can't take a deep breath but no respiratory distress (e.g., hurts to take a deep breath)    Negative: Shallow puncture wound    Negative: Collarbone is painful and difficulty raising arm    Negative: Patient is confused or is an unreliable provider of information (e.g., dementia, profound mental retardation, alcohol intoxication)    Negative: No prior tetanus shots (or is not fully vaccinated) and any wound (e.g., cut or scrape)    Negative: Patient wants to be seen    Negative: Last tetanus shot > 5 years ago and DIRTY cut or scrape    Negative: Last tetanus shot >10 years  ago and CLEAN cut or scrape    Protocols used: CHEST INJURY-A-OH

## 2021-06-13 NOTE — PROGRESS NOTES
"Admission History & Physical  Wayne Carlton, 12/11/1933, [id number]    [facility]  Trace Gasca MD (Inactive), None    Extended Emergency Contact Information  Primary Emergency Contact: Lisa Carlton   [facility]  Home Phone: [phone]  Relation: Spouse     Assessment and Plan:   Assessment: Onychocryptosis, onychauxis right great toenail  Plan: Performed total nail excision and matricectomy of the right great toenail  Active Problems:    * No active hospital problems. *      Chief Complaint:  Painful thick right great toenail     HPI:    Wayne Carlton is a 83 y.o. old male who presented to the clinic today complaining of a painful thick ingrown toenail involving the right great toe.  The patient stated that when he was a young man he dropped a heavy piece of wood on the toe.  He stated that the toe became discolored and very thick after the injury.  He stated that at this time he has been having more pain with the right great toenail.  He feels that the toenail is ingrown which is causing his pain.  The pain is aggravated by normal shoes.  He stated that the nail was also extremely thick and he has difficulty trimming the nail.  He has not had any recent redness, swelling, drainage or bleeding.  There are no factors which relieve his pain.  He denies any other previous treatment.  History is provided by patient    Medical History  Active Ambulatory (Non-Hospital) Problems    Diagnosis     Cervical disc disease - his chiropractor diagnosed, I infer, degenenative disc disease - no longer particularly painful.  2/6/17     Ankle swelling, left - he has had a \"total ankle replacement\" and there is long-term swelling.     Constipation     Chronic Sinusitis     Umbilical Hernia     Frozen shoulder - \"irreparable\" he says.  Has seen ortho.     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  2/6/17 02/06/2017     Ankle swelling, left - he has had a \"total ankle replacement\" and there is long-term swelling. 02/06/2017     Constipation 03/05/2015     Chronic Sinusitis      Umbilical Hernia      Frozen shoulder - \"irreparable\" he says.  Has seen ortho.      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   Past Surgical History:   Procedure Laterality Date     HERNIA REPAIR Bilateral      NE APPENDECTOMY      Description: Appendectomy;  Proc Date: 02/01/2012;  Comments: for appendiceal mucocele. Benign pathology.     NE ARTHRODESIS ANT INTERBODY MIN DISCECTOMY,LUMBAR      Description: Lumbar Vertebral Fusion;  Recorded: 01/06/2012;  Comments: L5 \"spinal graft\"     NE HEMORRHOIDECTOMY INTERNAL RUBBER BAND LIGATIONS      Description: Hemorrhoidectomy;  Proc Date: 01/01/1973;    Social History  Reviewed, and he  reports that he has never smoked. He has never used smokeless tobacco. He reports that he does not drink alcohol or use illicit drugs.  Social History   Substance Use Topics     " Smoking status: Never Smoker     Smokeless tobacco: Never Used     Alcohol use No      Comment: Never an issue, he states. 2/6/17      Allergies  Allergies   Allergen Reactions     Sulfa (Sulfonamide Antibiotics)     Family History  Reviewed, and family history includes Blindness in his brother; Heart disease in his brother; Hypertension in his brother; Jaundice in his mother; Leukemia in his father.   Psychosocial Needs  Social History     Social History Narrative     Additional psychosocial needs reviewed per nursing assessment.       Prior to Admission Medications     (Not in a hospital admission)        Review of Systems - Negative     /78  Pulse 88  Temp 98.1  F (36.7  C) (Temporal)   SpO2 96%    Objective findings: General: The patient is alert and in no acute distress      Integument: Nails bilateral feet are normal length and color.  The medial and lateral borders of the right great toenail are severely incurvated.  There is pain on palpation along the medial and lateral margins of the right  great toenail.       Vascular: DP and PT pulses +2/4 bilateral feet.  Capillary refill less than 2 seconds bilateral feet.      Neurologic: Negative clonus, negative Babinski bilaterally.      Musculoskeletal: Range of motion within normal limits bilaterally.  Muscle power +5/5 bilaterally in all compartments.      Assessment: Onychocryptosis, onychauxis right great toenail      Plan: I performed a total nail  excision and matrixectomy of the right great toenail  today under local anesthesia of 2% lidocaine plain via the phenol and alcohol technique.  The patient tolerated the procedure and anesthesia well and was discharged in good condition.    He was given both written and verbal postoperative instructions.

## 2021-06-14 NOTE — TELEPHONE ENCOUNTER
Patient's daughter Kiya informed. Patient's spouse Teresa is currently hospitalized. Patient has been weak, sleeps most of the days. Can sit up and stay awake for 45min-1 hour before he starts to shake and needs to lay down. Kiya states that his confusion is more than his normal overall function. His saturation rate has been around 89% when sitting, 86-88% when laying down.    Kiya will call 911 non-emergent for patient to be transported to hospital.

## 2021-06-14 NOTE — TELEPHONE ENCOUNTER
I do not have anything more to add other than the symptomatic treatment that is now being done.  I think it is a matter of choice in other words if the patient does not want to go into the hospital, I certainly would not force it, on the other hand he certainly could get treatment in the hospital he cannot get at home such as oxygen and probably other medications.  I do think if his condition worsens however then I would recommend he being taken to the emergency room.

## 2021-06-14 NOTE — TELEPHONE ENCOUNTER
Coronavirus (COVID-19) Notification    Reason for call  Notify of POSITIVE  COVID-19 lab result, assess symptoms,  review [facility] recommendations    Lab Result   Lab test for 2019-nCoV rRt-PCR or SARS-COV-2 PCR  Oropharyngeal AND/OR nasopharyngeal swabs were POSITIVE for 2019-nCoV RNA [OR] SARS-COV-2 RNA (COVID-19) RNA     We have been unable to reach Patient by phone at this time to notify of their Positive COVID-19 result.  Left voicemail message requesting a call back to [phone] [facility] for results.        POSITIVE COVID-19 Letter sent.    Federica Boyce LPN

## 2021-06-14 NOTE — PROGRESS NOTES
Clinic Care Coordination Contact  Care Coordination Transition Communication         Clinical Data: Patient was hospitalized at  from 12/29 to 1/05 with diagnosis of Covid.     Transition to Facility:              [facility] Name: [facility] [place]              Contact name and phone number/fax: Harriet [phone]    Plan:  Care Coordinator will await notification from facility staff informing  Care Coordinator of patient's discharge plans/needs.  Care Coordinator will review chart and outreach to facility staff every 4 weeks and as needed.

## 2021-06-14 NOTE — TELEPHONE ENCOUNTER
Clinic Action Needed: Yes, call back requested. Please call Kiya Bryant (santino) at [phone]. Okay to leave detailed message.    FNA Triage Call  Presenting Problem:    Santino Huertas reports that she is taking care of Wayne at this time. Both quarantined together. She is a hospice RN.    Reports the following:  - Wayne tested positive for COVID on 12/17.  - has dementia and is more confused now  - febrile  - decreased fluid intake. She had tu push 350-400 ml of fluids earlier (Ensure, juice and water)  - has increased weakness. He was able to walk yesterday. Today, unable to walk but able to stand with assistance beside his bed to use urinal   - had a fall on 12/25. Yesterday she found him kneeling beside his bed. Had to call 91 for both instances to help with lifting.  - patient is responsive    Most recent VS:  /80  Pulse 81  O2 sats - 86-88%    PLAN:  - FNA advised 911 per protocol and per patient presentation  - she would like to keep him out of the hospital, prefers a message sent to Dr. Colon for further recommendations.   - FNA encouraged to have her call 911 if patient has further decline.   She verbalized agreement.    Mary Puente RN/[facility] Nurse Advisor      Routed to: PCP            Reason for Disposition    Shock suspected (e.g., cold/pale/clammy skin, too weak to stand, low BP, rapid pulse)    Additional Information    Negative: SEVERE difficulty breathing (e.g., struggling for each breath, speaks in single words)    Negative: Difficult to awaken or acting confused (e.g., disoriented, slurred speech)    Negative: Bluish (or gray) lips or face now    Protocols used: CORONAVIRUS (COVID-19) DIAGNOSED OR [id number] 12.1.20

## 2021-06-15 NOTE — PROGRESS NOTES
Clinic Care Coordination Contact  Care Coordination Transition Communication        Clinical Data: Patient was hospitalized at  from 12/29 to 1/05 with diagnosis of Covid.      Transition to Facility:              [facility]              Contact name and phone number/fax: Harriet [phone]     Plan:     Message left for TCU LUANNE requesting update on discharge plan     SW Care Coordinator will await notification from facility staff informing SW Care Coordinator of patient's discharge plans/needs. SW Care Coordinator will review chart and outreach to facility staff every 4 weeks and as needed.

## 2021-06-15 NOTE — PROGRESS NOTES
Clinic Care Coordination Contact  Care Team Conversations     Message received from Niharika at [facility] in [place].  Pt was discharged from TCU facility on 1/28/21 to [facility] in [place], [place].      Plan: Pt not a candidate for clinic care coordination. No further outreach.

## 2021-06-15 NOTE — TELEPHONE ENCOUNTER
Belén from [facility] regarding patient admission. They need a med list and a problem list for patient. I printed this information out, had Dr. Colon sign it, and faxed it back to Belén. Nothing further needed.  Anisha Peters CMA ............... 9:27 AM, 02/08/21

## 2021-06-18 NOTE — PROGRESS NOTES
"Assessment/Plan:     1. Neck pain on right side  Suspect pain is musculoskeletal.  Discussed treatment including rest and ice/heat application.  I did encourage patient to continue gentle range of motion exercises.  No jugular symptoms indicating stenosis.  DDD is certainly a concern given his age.  Will treat symptomatically.  Highly encouraged heat application.  Prescribed tizanidine 3 times daily as needed for muscle spasm.  Tylenol or ibuprofen as needed for pain.  - tiZANidine (ZANAFLEX) 2 MG tablet; Take 1 tablet (2 mg total) by mouth 3 (three) times a day as needed.  Dispense: 30 tablet; Refill: 0        Subjective:     Wayne Carlton is a 84 y.o. male accompanied by his wife who presents with right-sided neck pain ×1 week.  Patient cannot recall any specific injury.  States it feels that he has a \"kink\" in his neck.  The area feels very tight.  Pain comes and goes, and is better when he is laying flat on his back.  He does have some radiation of pain to his right upper back.  No radiation into his hand or paresthesias.  Patient tells me he has had x-rays of his neck in the past by a chiropractor and was told that the \"cartilage was gone.\"  He also tells me that he has bilateral rotator cuff tears that are beyond treatment.  Pertinent history of lumbar back surgery in early adulthood.  Patient denies any pain in the jaw or chest.  He has been able to move his neck, but it feels stiff.  No over-the-counter treatments tried.    Of note, patient was seen in walk-in care 4 days ago with complaints of constipation.  He feels that this is improved.  He did have a bowel movement yesterday.    She has an [facility] care visit scheduled with Dr. Colon next month.    The following portions of the patient's history were reviewed and updated as appropriate: allergies, current medications, past family history, past medical history, past social history, past surgical history and problem list.    Review of Systems  A " comprehensive review of systems was performed and was otherwise negative    Objective:     /84 (Patient Site: Right Arm, Patient Position: Sitting, Cuff Size: Adult Regular)  Pulse 66  Wt 179 lb 8 oz (81.4 kg)  SpO2 97%  BMI 26.51 kg/m2    General Appearance: Alert, cooperative, no distress, appears stated age  Neck: Supple, symmetrical, trachea midline, no adenopathy;  thyroid: not enlarged, symmetric, no tenderness/mass/nodules; no carotid bruit or JVD.  No cervical spinal tenderness.  Mild tenderness palpated to the right paraspinal muscles and radiating to the right upper back.  Range of motion is normal without significant deficit.  Bilateral upper extremity strength intact.   strength equal bilaterally.      CAROLINE Coleman

## 2021-06-19 NOTE — PROGRESS NOTES
ASSESSMENT:  1. BPH (benign prostatic hyperplasia)  Nocturia, patient is on Flomax    2. Constipation  Chronic long-standing, presumed functional, variety of medications most recently all of oil.  - HM1(CBC and Differential)  - Basic Metabolic Panel  - Thyroid Stimulating Hormone (TSH)  - HM1 (CBC with Diff)    3. DDD (degenerative disc disease), cervical  Patient with a known history of cervical disc disease recent exacerbation improving.    4. Rotator cuff tear  Left sided, patient is not a surgical candidate appear    5. Tourette's Syndrome  Long-standing, stable.    6. Anxiety  Appears also to be a long-standing issue currently no therapy or medications.  - Thyroid Stimulating Hormone (TSH)    7. Sinusitis, chronic  Possibly allergic  - HM1(CBC and Differential)  - HM1 (CBC with Diff)    8. 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  - Basic Metabolic Panel  - Thyroid Stimulating Hormone (TSH)    PLAN:  1.  Pneumonia 13 vaccine.  2.  Laboratory studies as above.  3.  Discussed with the patient that physical therapy is an option at this clinic should there be issues with neck left shoulder other musculoskeletal issues.  4.  Patient can otherwise be seen yearly sooner as needed.    Orders Placed This Encounter   Procedures     Pneumococcal conjugate vaccine 13-valent 6wks-17yrs; >50yrs     Basic Metabolic Panel     Thyroid Stimulating Hormone (TSH)     HM1 (CBC with Diff)     There are no discontinued medications.    No Follow-up on file.    CHIEF COMPLAINT:  Chief Complaint   Patient presents with     Establish Care     weakness in his legs- both - all the time, issues with balance and ? fall /fall last week outside NEEDS: zoster/pcv13     Neck Pain     had an MRI yr ago and having more pain in neck and shoulder s     Constipation     issues - all his life- taking stool softner with olive oil-  which seems to help the past few weeks      Skin Problem     itchy skin all over his body - ears,legs and chest      Itchy Eye     sticky matter and watery in the am, eyes burn alot      SUBJECTIVE:  Wayne is a 84 y.o. male who presents to clinic to establish care. He is accompanied by his wife.    Leg Weakness: He recalls mowing the lawn last week which tired him. He was walking toward the house when he fell onto his knees. He tore a hole in his jeans. He was seen in clinic a few days later. His wounds had begun to heal and were not infected. His wife notes that with exercise he becomes extremely tired and weak. Afterward, he likes to eat a lot. He has noticed decreased muscle mass in his legs over the past few years. He walks while out of the house. He does not always feel steady on his feet. He still lives in his own house and is able to get around it without significant difficulty.    Cervicalgia: He has a history of cervicalgia. He was seen by a chiropractor who did an x-ray on his neck which showed severe degeneration of the cartilage in his cervical spine. He has been experiencing more frequent cervicalgia and shoulder arthralgia lately. He was prescribed a muscle relaxant which has been helpful in managing his pain recently. He can hear and feel clicking and cracking in his neck when he moves his head.    Constipation: He has had constipation since he was a young child. He has tried prune juice, bran, and various stools softeners over the years. Mineral oil has not been effective for him. He takes a daily stool softener with olive oil which has been helping him have regular bowel movements in the past few weeks. He has a history of hemorrhoids and anal fissures. He underwent a hemorrhoidectomy when he was 39 years old.    Skin: He has frequently itchy xeroderma scattered on his body, particularly on his ears, chest, and legs. His skin has been bothering him for a few months. He has not been using any lotions  or creams at this time.     Eye Itching: In the morning, his eyes feel wet to him. He rubs his eyes and notices drainage coming from his lacrimal ducts. His eyes are frequently itchy during the day and occasionally he experiences a burning sensation. His symptoms occur year round but have been worsening in the past 1-2 years. He has not yet been seen by an ophthalmologist. At his last eye exam, he was recommended to get a cataract removed from his left eye.    Neurologic: He has had 4-5 suspected neurological episodes, such as a TIA or seizure. He has not had one in the past two years. His first episode was about 20 years ago. When they occurred, he began to be unable to focus, comprehend while reading, and form intelligible words. He had visual deficits and felt disconnected from reality. His episodes lasted about 30 minutes before he felt like himself again. He felt weak afterward as well. He has had carotid ultrasounds and cervical MRIs performed in the past with no definitive diagnosis reached.    Health Maintenance: He eats a healthy, balanced diet because his wife is diabetic but he likes sweets. He does not currently get regular exercise aside from daily activities such as yard work. He is due for his pneumonia and shingles vaccines today.    REVIEW OF SYSTEMS:   He takes Flomax daily for urinary frequency and nocturnal urination. Depending on how much he drinks, he wakes up 2-5 times overnight to urinate. He has an irreparable left rotator cuff. He has a history of Tourette's Syndrome. He has a history of anxiety which was not improved with the muscle relaxant. He has not taken medication or sought counseling to manage his anxiety. He has tried to discipline himself and concentrate on quelling his anxieties himself without success. He has a history of seasonal allergies and sinus congestion as a child. His symptoms seemed to improve over the years, however, he continues to experience occasional sinus  "congestion that causes postnasal drainage. He has tried nasal sprays in the past without success. He is not currently taking any antihistamines. He struck his head on a cupboard door this morning causing a small abrasion on his scalp. All other systems are negative.    PHQ-9 Total Score: 10 (2018 11:00 AM)  SHLOMO-7 Total Score:11   (2018 11:00 AM)    PFSH:  He is active in his Religious and kevin community.    Immunization History   Administered Date(s) Administered     Influenza, Seasonal, Inj PF IIV3 2009     Influenza, inj, historic,unspecified 2010     Pneumo Polysac 23-V 2000     Td,adult,historic,unspecified 2000, 2011     Social History     Social History     Marital status:      Spouse name: N/A     Number of children: 2     Years of education: N/A     Occupational History     Garbage route      Retired     Social History Main Topics     Smoking status: Never Smoker     Smokeless tobacco: Never Used     Alcohol use No      Comment: Never an issue, he states. 17     Drug use: No     Sexual activity: Yes     Partners: Female     Other Topics Concern     Not on file     Social History Narrative    Diet-eat healthy        Exercise-not regular        2nd marriage     History reviewed. No pertinent past medical history.  Past Surgical History:   Procedure Laterality Date     APPENDECTOMY      Proc Date: 2012;  Comments: for appendiceal mucocele. Benign pathology.     HEMORRHOID SURGERY      Description: Hemorrhoidectomy;  Proc Date: 1973;     INGUINAL HERNIA REPAIR Bilateral      LUMBAR FUSION      Description: Lumbar Vertebral Fusion;  Recorded: 2012;  Comments: L5 \"spinal graft\"     TOTAL ANKLE REPLACEMENT Right      Family History   Problem Relation Age of Onset     Jaundice Mother       at 97, with jaundice.     Leukemia Father       at 47 - sounds like AML     Heart disease Brother       of a heart attack.     Hypertension " "Brother      Blindness Brother      Unilateral blindness, variable for 6 weeks as of 2/6/17     MEDICATIONS:  Current Outpatient Prescriptions   Medication Sig Dispense Refill     aspirin 81 MG EC tablet Take 81 mg by mouth every other day.        docoshexanoic acid-eicosapent 500 mg (FISH OIL) 500-100 mg cap capsule Take 1,000 mg by mouth 2 (two) times a day.       docusate sodium (STOOL SOFTENER) 100 mg capsule Take 100 mg by mouth as needed for constipation.       hydrocortisone 2.5 % ointment Insert 1 application into the rectum as needed.        melatonin 5 mg Tab Take 1 tablet by mouth bedtime.       polyethylene glycol 3350 Powd Use As Directed. 1/2-1 cap full daily       tamsulosin (FLOMAX) 0.4 mg Cp24 Take 0.4 mg by mouth bedtime.       tiZANidine (ZANAFLEX) 2 MG tablet Take 1 tablet (2 mg total) by mouth 3 (three) times a day as needed. 30 tablet 0     No current facility-administered medications for this visit.      TOBACCO USE:  History   Smoking Status     Never Smoker   Smokeless Tobacco     Never Used     VITALS:  Vitals:    07/06/18 1049   BP: 118/66   Pulse: 70   Weight: 181 lb (82.1 kg)   Height: 5' 8.5\" (1.74 m)     Wt Readings from Last 3 Encounters:   07/06/18 181 lb (82.1 kg)   06/04/18 179 lb 8 oz (81.4 kg)   05/31/18 180 lb 3.2 oz (81.7 kg)     PHYSICAL EXAM:  Constitutional:   Reveals an alert male in no acute distress.  Vitals: per nursing notes.  HEENT:  Ears:  External canals, TMs clear.    Eyes:  EOMs full, PERRL.  Lungs: Clear to A&P without rales or wheezes.  Respiratory effort normal.  Cardiac:   Regular rate and rhythm, normal S1, S2, no murmur or gallop.  Musculoskeletal: No peripheral swelling.  Neuro:  Alert and oriented. Cranial nerves, motor, sensory exams are intact.  No gross focal deficits.  Psychiatric:  Memory intact, mood appropriate.    DATA REVIEWED:  Additional History from Old Records Summarized (2): Reviewed Stephanie Tamayo's note from 6/4/18 regarding evaluation of " right cervicalgia.  Decision to Obtain Records (1): None  Radiology Tests Summarized or Ordered (1): None  Labs Reviewed or Ordered (1): Ordered labs.  Medicine Test Summarized or Ordered (1): None  Independent Review of EKG, X-RAY, or RAPID STREP (2 each): None    The visit lasted a total of 30 minutes face to face with the patient. Over 50% of the time was spent counseling and educating the patient about management of his chronic conditions.    ILc, am scribing for and in the presence of, Dr. Colon.    I, Dr. Colon, personally performed the services described in this documentation, as scribed by Lc Jennings in my presence, and it is both accurate and complete.    Total data points: 3

## 2021-06-19 NOTE — ANESTHESIA POSTPROCEDURE EVALUATION
Patient: Wayne Carlton  CHOLECYSTECTOMY, LAPAROSCOPIC, CHOLANGIOGRAMS  Anesthesia type: general    Patient location: PACU  Last vitals:   Vitals:    08/05/18 1735   BP: 148/59   Pulse: 93   Resp: 18   Temp: 36.5  C (97.7  F)   SpO2: 93%     Post vital signs: stable  Level of consciousness: awake and responds to simple questions  Post-anesthesia pain: pain controlled  Post-anesthesia nausea and vomiting: no  Pulmonary: unassisted, return to baseline  Cardiovascular: stable and blood pressure at baseline  Hydration: adequate  Anesthetic events: no    QCDR Measures:  ASA# 11 - Nela-op Cardiac Arrest: ASA11B - Patient did NOT experience unanticipated cardiac arrest  ASA# 12 - Nela-op Mortality Rate: ASA12B - Patient did NOT die  ASA# 13 - PACU Re-Intubation Rate: ASA13B - Patient did NOT require a new airway mgmt  ASA# 10 - Composite Anes Safety: ASA10A - No serious adverse event    Additional Notes:

## 2021-06-19 NOTE — ANESTHESIA CARE TRANSFER NOTE
Last vitals:   Vitals:    08/05/18 1655   BP: 120/66   Pulse: 98   Resp: 16   Temp: 36.7  C (98.1  F)   SpO2: 100%     Patient's level of consciousness is drowsy  Spontaneous respirations: yes  Maintains airway independently: yes  Dentition unchanged: yes  Oropharynx: oropharynx clear of all foreign objects    QCDR Measures:  ASA# 20 - Surgical Safety Checklist: WHO surgical safety checklist completed prior to induction  PQRS# 430 - Adult PONV Prevention: 4558F - Pt received => 2 anti-emetic agents (different classes) preop & intraop  ASA# 8 - Peds PONV Prevention: NA - Not pediatric patient, not GA or 2 or more risk factors NOT present  PQRS# 424 - Nela-op Temp Management: 4559F - At least one body temp DOCUMENTED => 35.5C or 95.9F within required timeframe  PQRS# 426 - PACU Transfer Protocol: - Transfer of care checklist used  ASA# 14 - Acute Post-op Pain: ASA14B - Patient did NOT experience pain >= 7 out of 10

## 2021-06-19 NOTE — LETTER
Letter by Zac Colon MD at      Author: Zac Colon MD Service: -- Author Type: --    Filed:  Encounter Date: 5/10/2019 Status: (Other)         Wayne Carlton  [address]  [place] [address]             May 10, 2019         Dear Mr. Carlton,    Below are the results from your recent visit: Hemoglobin is just barely low, this is not significant, I am not concerned that there is any internal bleeding.    Resulted Orders   HM2(CBC w/o Differential)   Result Value Ref Range    WBC 5.7 4.0 - 11.0 thou/uL    RBC 4.25 (L) 4.40 - 6.20 mill/uL    Hemoglobin 13.9 (L) 14.0 - 18.0 g/dL    Hematocrit 41.8 40.0 - 54.0 %    MCV 98 80 - 100 fL    MCH 32.6 27.0 - 34.0 pg    MCHC 33.2 32.0 - 36.0 g/dL    RDW 12.1 11.0 - 14.5 %    Platelets 185 140 - 440 thou/uL    MPV 8.1 7.0 - 10.0 fL            Please call with questions or contact us using [employer].    Sincerely,        Electronically signed by Zac Colon MD

## 2021-06-19 NOTE — PROGRESS NOTES
ASSESSMENT:  1. 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    2. Transient cerebral ischemia, unspecified type  Most recent episode, deemed to be a TIA.    3. BPH (benign prostatic hyperplasia)  Patient is still having significant nocturia despite being on Flomax.      PLAN:  1.  Discussed with the patient that I would treat him as if he did have an may have had in the past TIAs.  2.  Patient is already on low-dose aspirin, though he does get bruising try taking it every day or every other day.  3.  Start simvastatin 20 mg daily, prescription given but has not yet started.  4.  Increase Flomax from 0.4-0.8 mg nightly, patient will double up on his existing prescription.  5.  If the patient still is having significant BPH symptoms despite the maximum dose of Flomax I would have him go back to his urologist.  6.  Three-month follow-up.    No orders of the defined types were placed in this encounter.    Medications Discontinued During This Encounter   Medication Reason     tamsulosin (FLOMAX) 0.4 mg Cp24 Reorder       No Follow-up on file.    CHIEF COMPLAINT:  Chief Complaint   Patient presents with     Hospital Visit Follow Up     TIA- feeling ok, a littl foggy in the head at times- concerns: urine frequency at night- talk about rx ,  ? if simvastin is benefical   NEEDS: zoster        SUBJECTIVE:  Wayne is a 84 y.o. male who comes in for follow-up hospitalization.  Patient was hospitalized, discharged July 21.  He had a sudden episode of confused and garbled speech which he has had in the past.  Patient also had some numbness right side of the face.  Patient has had numerous episodes over the years of similar episodes and there is been a debate as to whether not this is a TIA versus a seizure.  Most recent episode was deemed to be a TIA and the patient was recommended to  start the statin which she has not yet started.    I reviewed the discharge summary as well as a CT and MRI scans.  I discussed with the patient that while there certainly is some debate and uncertainty I certainly think is reasonable given his symptoms to treat this as if it were a TIA.  He does take aspirin but he finds that he gets what he presumes is some bruising with the aspirin I told him if he can at least try it every other day he still will get the benefit but try it daily to see if he in fact is getting significant bruising.    I discussed with the patient the rationale for starting simvastatin, namely to reduce the risk of possible future stroke.  Also discussed regular exercise and diet he does in general eat well and remains active.    He also has known underlying BPH he is getting up every 1-2 hours at night does not always fall back to sleep and therefore is tired.  The Flomax does not seem to be helping that much discussed that we could increase this dose see if this helps and if not he would go back to urology.    REVIEW OF SYSTEMS:      All other systems are negative.    PFSH:  Immunization History   Administered Date(s) Administered     Influenza, Seasonal, Inj PF IIV3 09/24/2009     Influenza, inj, historic,unspecified 08/28/2010     Pneumo Conj 13-V (2010&after) 07/06/2018     Pneumo Polysac 23-V 08/16/2000     Td,adult,historic,unspecified 08/16/2000, 01/07/2011     Social History     Social History     Marital status:      Spouse name: N/A     Number of children: 2     Years of education: N/A     Occupational History     Garbage route      Retired     Social History Main Topics     Smoking status: Never Smoker     Smokeless tobacco: Never Used     Alcohol use No      Comment: Never an issue, he states. 2/6/17     Drug use: No     Sexual activity: Yes     Partners: Female     Other Topics Concern     Not on file     Social History Narrative    Diet-eat healthy        Exercise-not  regular        2nd marriage     No past medical history on file.  Family History   Problem Relation Age of Onset     Jaundice Mother       at 97, with jaundice.     Leukemia Father       at 47 - sounds like AML     Heart disease Brother       of a heart attack.     Hypertension Brother      Blindness Brother      Unilateral blindness, variable for 6 weeks as of 17       MEDICATIONS:  Current Outpatient Prescriptions   Medication Sig Dispense Refill     aspirin 81 MG EC tablet Take 1 tablet (81 mg total) by mouth daily.  0     docusate sodium (STOOL SOFTENER) 100 mg capsule Take 100 mg by mouth as needed for constipation.       hydrocortisone 2.5 % ointment Apply 1 application topically 2 (two) times a day as needed (for itching).        melatonin 5 mg Tab Take 1 tablet by mouth bedtime.       simvastatin (ZOCOR) 20 MG tablet Take 1 tablet (20 mg total) by mouth at bedtime. 30 tablet 0     tamsulosin (FLOMAX) 0.4 mg cap Take two capsules at night 180 capsule 0     No current facility-administered medications for this visit.        TOBACCO USE:  History   Smoking Status     Never Smoker   Smokeless Tobacco     Never Used       VITALS:  Vitals:    18 1152   BP: 118/70   Pulse: 64   Weight: 179 lb (81.2 kg)     Wt Readings from Last 3 Encounters:   18 179 lb (81.2 kg)   18 178 lb 8 oz (81 kg)   18 180 lb (81.6 kg)       PHYSICAL EXAM:  Constitutional:   Reveals a male who appears his stated age.  Vitals: per nursing notes.  Musculoskeletal: No peripheral swelling.  Neuro:  Alert and oriented. Cranial nerves, motor, sensory exams are intact.  No gross focal deficits.  Psychiatric:  Memory intact, mood appropriate.    QUALITY MEASURES:      DATA REVIEWED:  Discharge summary from  reviewed discussing symptoms consistent with a TIA as well as a CT scan and MRI.  MRI did not show any obvious stroke.

## 2021-06-19 NOTE — PROGRESS NOTES
TCM DISCHARGE FOLLOW UP CALL    Discharge Date:  8/6/2018  Reason for hospital stay (discharge diagnosis)::  Lap zachary  Are you feeling better, the same or worse since your discharge?:  Patient is feeling better (sometimes good, sometimes nauseous.)  Do you feel like you have a plan in the event of a health emergency?: Yes    As part of your discharge plan, were  home care services ordered for you?: No    Did you receive any new medications, or was there a change to your medications?: Yes    Are you taking those medications, or do you have any established regiment?:  Hydrocodone/acetaminophen. Hasn't taken, taking Tylenol instead. Has increased tamsulosin to 2 but was taking it two times a day. Instructed to take 2 HS  Do you have any follow up visits scheduled with your PCP or Specialist?:  No  I'm glad to hear you're doing well and we want you to continue to do well. Your PCP would like to see you for a follow-up visit. Can we help set that up for your today?: No    (RN) Provided patient the PCP's phone number to call if they have any questions or concerns?: Yes    RN NOTES::  Instructed pt to call clinic if he doesn't have a BM every other day   Has intermittent nausea. Instructed to call clinic if he can't eat. Pt is drinking fluids.

## 2021-06-19 NOTE — ANESTHESIA PREPROCEDURE EVALUATION
Anesthesia Evaluation      Patient summary reviewed   No history of anesthetic complications     Airway   Mallampati: I  Neck ROM: full   Pulmonary - negative ROS and normal exam                          Cardiovascular - normal exam  Exercise tolerance: > or = 4 METS  (+) , hypercholesterolemia,     (-) murmur  ECG reviewed  Rhythm: regular  Rate: normal,    no murmur      Neuro/Psych      Endo/Other - negative ROS      GI/Hepatic/Renal - negative ROS      Other findings: Recent workup for TIAs      Dental - normal exam                        Anesthesia Plan  Planned anesthetic: general endotracheal    ASA 3   Induction: intravenous   Anesthetic plan and risks discussed with: patient and spouse  Anesthesia plan special considerations: antiemetics,   Post-op plan: routine recovery

## 2021-06-20 NOTE — PROGRESS NOTES
Attempt 1: Care Guide called patient.  If this patient is returning my call, please transfer to Marsha at ext [phone].      Potential Patient.

## 2021-06-20 NOTE — PROGRESS NOTES
The Clinic Care Guide called the patient  today at the request of the PCP to discuss possible clinic care coordination enrollment. Clinic care coordination was described to the patient and immediate needs were discussed. The patient declined enrollment in clinic care coordination at this time. The patient was provided with contact information for the clinic care guide if their needs changed.    * Patient is [place] and Wife was on the phone as well- wife wanted to enroll but  did not at this time he wants  To think about it.       Marsha Richardson   [phone]  Clinic Care Coordinator

## 2021-06-21 NOTE — LETTER
Letter by Zac Colon MD at      Author: Zac Colon MD Service: -- Author Type: --    Filed:  Encounter Date: 11/6/2020 Status: (Other)         Wayne Carlton  [address]  [facility] [address]             November 6, 2020         Dear Mr. Carlton,    Below are the results from your recent visit: Labs ordered by your specialist.    Resulted Orders   Valproic Acid, Free (Depakene , Free)   Result Value Ref Range    Scan Result See Scanned Report     Valproic Acid, Unbound 6.5 6.0 - 20.0 ug/mL      Comment:        Performed by:                [facility]  [address] [place] [address]  [phone]   Valproic Acid (Depakene )   Result Value Ref Range    Valproic Acid 53.7 50.0 - 150.0 ug/mL      Comment:      Recommended therapeutic trough conc range when used  for manic episodes associated with bipolar disorder:   ug/ml.   HM2(CBC w/o Differential)   Result Value Ref Range    WBC 6.3 4.0 - 11.0 thou/uL    RBC 4.01 (L) 4.40 - 6.20 mill/uL    Hemoglobin 13.3 (L) 14.0 - 18.0 g/dL    Hematocrit 40.2 40.0 - 54.0 %     80 - 100 fL    MCH 33.2 27.0 - 34.0 pg    MCHC 33.1 32.0 - 36.0 g/dL    RDW 11.4 11.0 - 14.5 %    Platelets 191 140 - 440 thou/uL    MPV 8.1 7.0 - 10.0 fL   Hepatic Profile   Result Value Ref Range    Bilirubin, Total 0.5 0.0 - 1.0 mg/dL    Bilirubin, Direct 0.2 <=0.5 mg/dL    Protein, Total 6.2 6.0 - 8.0 g/dL    Albumin 3.3 (L) 3.5 - 5.0 g/dL    Alkaline Phosphatase 62 45 - 120 U/L    AST 22 0 - 40 U/L    ALT 15 0 - 45 U/L            Please call with questions or contact us using [employer].    Sincerely,        Electronically signed by Zac Colon MD

## 2021-06-21 NOTE — LETTER
Letter by Severson, Tammie F, LPN at      Author: Severson, Tammie F, LPN Service: -- Author Type: --    Filed:  Encounter Date: 12/24/2020 Status: (Other)       12/24/2020      [place]  [address] [facility] [address]          No results found for: 2019NCOV    SARS-CoV-2 PCR Result (no units)   Date Value   12/23/2020 Positive (!!)       This letter provides a written record that you were tested for COVID-19.    Your result was positive. This means you have COVID-19 (coronavirus).    How can I protect others?If you have symptoms, stay home and away from others (self-isolate) until:Youve had no fever--and no medicine that reduces fever--for 1 full day (24 hours). And?     Your other symptoms have gotten better. For example, your cough or breathing has improved. And?    At least 10 days have passed since your symptoms started. (If you've been told by a doctor that you have a weak immune system, wait 20 days.)    If you dont have symptoms: Stay home and away from others (self-isolate) until at least 10 days have passed since your first positive COVID-19 test. If you have a weak immune system, please self-isolate for 20 days.    During this time:    Stay in your own room, including for meals. Use your own bathroom if you can.    Stay away from others in your home. No hugging, kissing or shaking hands. No visitors.     Dont go to work, school or anywhere else.     Clean high touch surfaces often (doorknobs, counters, handles, etc.). Use a household cleaning spray or wipes. Youll find a full list on the EPA website at www.epa.gov/pesticide-registration/list-n-disinfectants-use-against-sars-cov-2.     Cover your mouth and nose with a mask, tissue or washcloth to avoid spreading germs.    Wash your hands and face often with soap and water.    Caregivers in these groups are at risk for severe illness due to COVID-19:  o People 65 years and older  o People who live in a nursing home or long-term care  facility  o People with chronic disease (lung, heart, cancer, diabetes, kidney, liver, immunologic)  o People who have a weakened immune system, including those who:    Are in cancer treatment    Take medicine that weakens the immune system, such as corticosteroids    Had a bone marrow or organ transplant    Have an immune deficiency    Have poorly controlled HIV or AIDS    Are obese (body mass index of 40 or higher)    Smoke regularly    Caregivers should wear gloves while washing dishes, handling laundry and cleaning bedrooms and bathrooms.    Wash and dry laundry with special caution. Dont shake dirty laundry, and use the warmest water setting you can.    If you have a weakened immune system, ask your doctor about other actions you should take.    For more tips, go to www.cdc.gov/coronavirus/2019-ncov/downloads/10Things.pdf.    You should not go back to work until you meet the guidelines above for ending your home isolation. You don't need to be retested for COVID-19 before going back to work- studies show that you won't spread the virus if it's been at least 10 days since your symptoms started (or 20 days, if you have a weak immune system).    Employers: This document serves as formal notice of your employees medical guidelines for going back to work. They must meet the above guidelines before going back to work in person.    How can I take care of myself?    1. Get lots of rest. Drink extra fluids (unless a doctor has told you not to).    2. Take Tylenol (acetaminophen) for fever or pain. If you have liver or kidney problems, ask your family doctor if its okay to take Tylenol.     Take either:     650 mg (two 325 mg pills) every 4 to 6 hours, or?    1,000 mg (two 500 mg pills) every 8 hours as needed.     Note: Dont take more than 3,000 mg in one day. Acetaminophen is found in many medicines (both prescribed and over-the-counter medicines). Read all labels to be sure you dont take too much.    For children,  check the Tylenol bottle for the right dose (based on their age or weight).    3. If you have other health problems (like cancer, heart failure, an organ transplant or severe kidney disease): Call your specialty clinic if you dont feel better in the next 2 days.    4. Know when to call 911: Emergency warning signs include:    Trouble breathing or shortness of breath    Pain or pressure in the chest that doesnt go away    Feeling confused like you havent felt before, or not being able to wake up    Bluish-colored lips or face    5. Sign up for [employer]. We know its scary to hear that you have COVID-19. We want to track your symptoms to make sure youre okay over the next 2 weeks. Please look for an email from [employer]--this is a free, online program that well use to keep in touch. To sign up, follow the link in the email. Learn more at [email or web address].    Where can I get more information?    [facility]: [email or web address]    Coronavirus Basics: www.health.[facility].[place]/diseases/coronavirus/basics.html    What to Do If Youre Sick: www.cdc.gov/coronavirus/2019-ncov/about/steps-when-sick.html    Ending Home Isolation: www.cdc.gov/coronavirus/2019-ncov/hcp/disposition-in-home-patients.html     Caring for Someone with COVID-19: www.cdc.gov/coronavirus/2019-ncov/if-you-are-sick/care-for-someone.html     [facility] clinical trials (COVID-19 research studies): [email or web address].[email or web address]/[email or web address]-clinical-trials

## 2021-06-21 NOTE — PROGRESS NOTES
ASSESSMENT:  1. Constipation  Chronic and long-standing, has been difficult to regulate bowel movements.    2. Anal itching  Probably secondary to stool issues as above.  I do not see any evidence of a hemorrhoid.    3. Memory loss  Issue perhaps as long as 5 years so has worsened recently.    4. Tourette's Syndrome  Chronic and long-standing, has worsened.      PLAN:  1.  High-dose influenza vaccine.  2.  In terms of the constipation, discussed with the patient that there is no fail safe method or dosing he will have to simply experiment.  3.  For the anal itching, Anusol 2.5% cream to apply up to twice daily to the anorectal area.  4.  I will have our pharmacist look into Tourette's treatment, though would have to consider age and comorbidities in terms of any treatment.  5. 6-month follow-up.    Orders Placed This Encounter   Procedures     Influenza High Dose, Seasonal     There are no discontinued medications.    No Follow-up on file.    CHIEF COMPLAINT:  Chief Complaint   Patient presents with     Anal Itching     has a slight scab which is bleeding - started o/o the past few months      Memory Loss     issues with thinking      Constipation     taking laxatives which causes diarrhea at times        SUBJECTIVE:  Wayne is a 84 y.o. male who presents with chronic constipation. The patient adds he has had problems recently with itching in the anal region. He states he has tried various remedies to resolve his constipation but has been largely unsuccessful. He has attempted multiple laxatives and prune juice among other things. The laxatives will oftentimes lead to diarrhea. In regards to the itching, the patient will itch while asleep and wake up with blood in his shorts or bruising from the scratching. The patient applies hydrocortisone cream to the affected area which provides minimal relief.    The patient reports he has had progressively worsening problems with his memory. He was seen by a neurologist five  years ago. He states he has issues with short-term memory and remembering names of people.     The patient also complains of his Tourette's syndrome and asks if anything can improve his twitching.    REVIEW OF SYSTEMS:   All other systems are negative.    PFSH:  Immunization History   Administered Date(s) Administered     Influenza high dose, seasonal 10/22/2018     Influenza, Seasonal, Inj PF IIV3 2009     Influenza, inj, historic,unspecified 2010     Pneumo Conj 13-V (2010&after) 2018     Pneumo Polysac 23-V 2000     Td,adult,historic,unspecified 2000, 2011     Social History     Social History     Marital status:      Spouse name: N/A     Number of children: 2     Years of education: N/A     Occupational History     Garbage route      Retired     Social History Main Topics     Smoking status: Never Smoker     Smokeless tobacco: Never Used     Alcohol use No      Comment: Never an issue, he states. 17     Drug use: No     Sexual activity: Yes     Partners: Female     Other Topics Concern     Not on file     Social History Narrative    Diet-eat healthy        Exercise-not regular        2nd marriage     History reviewed. No pertinent past medical history.  Family History   Problem Relation Age of Onset     Jaundice Mother       at 97, with jaundice.     Leukemia Father       at 47 - sounds like AML     Heart disease Brother       of a heart attack.     Hypertension Brother      Blindness Brother      Unilateral blindness, variable for 6 weeks as of 17       MEDICATIONS:  Current Outpatient Prescriptions   Medication Sig Dispense Refill     aspirin 81 MG EC tablet Take 1 tablet (81 mg total) by mouth daily.  0     docusate sodium (STOOL SOFTENER) 100 mg capsule Take 100 mg by mouth daily as needed for constipation.        HYDROcodone-acetaminophen 5-325 mg per tablet Take 1 tablet by mouth every 4 (four) hours as needed. 20 tablet 0     hydrocortisone  2.5 % ointment Apply 1 application topically 2 (two) times a day as needed (for itching).        melatonin 5 mg Tab Take 5 mg by mouth at bedtime.        simvastatin (ZOCOR) 20 MG tablet Take 1 tablet (20 mg total) by mouth at bedtime. 90 tablet 3     tamsulosin (FLOMAX) 0.4 mg cap Take 1 capsule (0.4 mg total) by mouth daily after supper. 30 capsule 5     hydrocortisone (ANUSOL-HC) 2.5 % rectal cream Apply to rectal area up to two times daily for itching. 30 g 2     No current facility-administered medications for this visit.        TOBACCO USE:  History   Smoking Status     Never Smoker   Smokeless Tobacco     Never Used       VITALS:  Vitals:    10/22/18 1127   BP: 110/60   Pulse: 62   Weight: 181 lb (82.1 kg)     Wt Readings from Last 3 Encounters:   10/22/18 181 lb (82.1 kg)   08/05/18 180 lb 1.6 oz (81.7 kg)   07/23/18 179 lb (81.2 kg)       PHYSICAL EXAM:  Constitutional:   Reveals a male who appears his stated age.  Vitals: per nursing notes.  Musculoskeletal: No peripheral swelling.  Neuro:  Alert and oriented. Cranial nerves, motor, sensory exams are intact.  No gross focal deficits.  Psychiatric:  Memory intact, mood appropriate.  Anal rectal area looks dry, slightly red and inflamed, several scratch marks visible.    QUALITY MEASURES:      DATA REVIEWED:  Additional History from Old Records Summarized (2):   Decision to Obtain Records (1):   Radiology Tests Summarized or Ordered (1):   Labs Reviewed or Ordered (1):   Medicine Test Summarized or Ordered (1):   Independent Review of EKG, X-RAY, or RAPID STREP (2 each):     The visit lasted a total of 14 minutes face to face with the patient. Over 50% of the time was spent counseling and educating the patient about his constipation.    By signing my name below, I, Mikayla Buitrago, attest that this documentation has been prepared under the direction and in the presence of Dr. Zac Colon.  Electronic Signature: Martita Cao. 10/22/2018  11:51.    I, Dr. Colon, personally performed the services described in this documentation. All medical record entries made by the scribe were at my direction and in my presence. I have reviewed the chart and discharge instructions (if applicable) and agree that the record reflects my personal performance and is accurate and complete.      Total data points: 0

## 2021-06-23 NOTE — TELEPHONE ENCOUNTER
"Call from pt     CC: Ongoing issues with constipation     > Chronic constipation appears to be problematic off and on for pt   > Reports to have tried various therapies for limited periods of time with some success though nothing has provided a conclusive resolution to the problem  >He is wondering about referral to GI specialist for this   > Last seen for this was 10/22/2018  >Last BM was yesterday - Strained and was had hard consistency   >No blood in stool   >No fever   >Occational ABD cramping  - \"I think its just gas\"     > Has tried the following:       MOM // Olive oil // Miralax (OTC PEG 3350) // Prune juice  // Metamucil     >Current fluid intake is  <8 glasses / day (maybe 2-3 glasses per pt)   >Some exercise - walking   >Feels he is getting adequate fruits / vegetable (fiber)       A/P  >No straining - too much pressure on heart and blood vessels   >Discussed need for increasing water intake unless otherwise directed not to - water will help lubricate and supports the work of fiber  >Increase activity as tolerated - will help move GI tract - walking is always good option as able     >Reviewed current med list - no current opioids - no longer on fluphenazine - no supplements   >This condition can wax and wane for some people - not always a \"one size fits all\" approach for this for everyone    >will send note to Isaiah re: referral - they may want to see you in clinic instead (he understands)       Confirmed tele#  and pharmacy        Jono Villa, RN   Triage and Medication Refills    "

## 2021-06-23 NOTE — TELEPHONE ENCOUNTER
Refill Approved    Rx renewed per Medication Renewal Policy. Medication was last renewed on 8/20/18.    Madelyn Morales, [facility] Connection Triage/Med Refill 1/17/2019     Requested Prescriptions   Pending Prescriptions Disp Refills     tamsulosin (FLOMAX) 0.4 mg cap [Pharmacy Med Name: TAMSULOSIN HCL 0.4 MG CAPSULE] 30 capsule 3     Sig: TAKE 1 CAPSULE (0.4 MG TOTAL) BY MOUTH DAILY AFTER SUPPER.    Alfuzosin/Tamsulosin/Silodosin Refill Protocol  Passed - 1/16/2019  1:47 PM       Passed - PCP or prescribing provider visit in past 12 months      Last office visit with prescriber/PCP: 10/22/2018 Zac Colon MD OR same dept: 10/22/2018 Zac Colon MD OR same specialty: 10/22/2018 Zac Colon MD  Last physical: Visit date not found Last MTM visit: Visit date not found   Next visit within 3 mo: Visit date not found  Next physical within 3 mo: Visit date not found  Prescriber OR PCP: Zac Colon MD  Last diagnosis associated with med order: There are no diagnoses linked to this encounter.  If protocol passes may refill for 12 months if within 3 months of last provider visit (or a total of 15 months).

## 2021-06-23 NOTE — TELEPHONE ENCOUNTER
RN triage   Call from pt   Pt had called a couple weeks ago about constipation and requested/recieved a referral to GI -- has appt scheduled w/ GI for 2/1   Pt states he was told to increased drinking water to 8 glasses / day --   Pt states he is drinking 8 glasses of water per day and not having constipation --   Pt passing stool  Q 1-2.5 days -- soft/ easy to pass -- pt states he has hemorrhoids and there are few drops of blood with stool   Pt also states he takes 1 tbsp of olive oil per day and takes colace every couple of days   Pt states he would like to cancel GI appt -- does not want to go out in bad weather and plans to continue with increased water and olive oil and colace - -- and requesting PCP advice   No other symptoms  OK to cancel GI appt?  Mackenzie Mosley RN [facility] RN triage

## 2021-06-23 NOTE — TELEPHONE ENCOUNTER
I did call and speak with, and told him we would cancel to gastroenterology appointment and I sent in a message to our specialty   Dr. Colon

## 2021-06-25 NOTE — PROGRESS NOTES
"Progress Notes by Trace Gasca MD at 2017 10:40 AM     Author: Trace Gasca MD Service: -- Author Type: Physician    Filed: 2017  9:29 AM Encounter Date: 2017 Status: Signed    : Trace Gasca MD (Physician)       2017     Visit Vitals   ? /70   ? Pulse 72   ? Temp 97.3  F (36.3  C) (Oral)   ? Ht 5' 9\" (1.753 m)   ? Wt 186 lb (84.4 kg)   ? BMI 27.47 kg/m2       Past Medical History:   Diagnosis Date   ? TIA (transient ischemic attack)        Social History     Social History   ? Marital status:      Spouse name: N/A   ? Number of children: N/A   ? Years of education: N/A     Occupational History   ? Not on file.     Social History Main Topics   ? Smoking status: Never Smoker   ? Smokeless tobacco: Never Used   ? Alcohol use No      Comment: Never an issue, he states. 17   ? Drug use: No   ? Sexual activity: Not on file     Other Topics Concern   ? Not on file     Social History Narrative       Family History   Problem Relation Age of Onset   ? Jaundice Mother       at 97, with jaundice.   ? Leukemia Father       at 47 - sounds like AML   ? Heart disease Brother       of a heart attack.   ? Hypertension Brother    ? Blindness Brother      Unilateral blindness, variable for 6 weeks as of 17       As part of this visit I have today personally reviewed past medical history, family medical history, social history (specifically including tobacco use), current medications and intolerances/allergies.  I have updated and/or or corrected these areas of history as may have been appropriate.    Allergies   Allergen Reactions   ? Sulfa (Sulfonamide Antibiotics)        Current Outpatient Prescriptions   Medication Sig   ? aspirin 81 MG EC tablet Take 81 mg by mouth every other day.    ? docoshexanoic acid-eicosapent 500 mg (FISH OIL) 500-100 mg cap capsule Take 1,000 mg by mouth 2 (two) times a day.   ? docusate sodium (STOOL SOFTENER) 100 mg capsule " Take 100 mg by mouth as needed for constipation.   ? hydrocortisone 2.5 % ointment Insert 1 application into the rectum as needed.    ? melatonin 5 mg Tab Take 1 tablet by mouth bedtime.   ? polyethylene glycol 3350 Powd Use As Directed. 1/2-1 cap full daily   ? tamsulosin (FLOMAX) 0.4 mg Cp24 Take 0.4 mg by mouth bedtime.       Patient Active Problem List   Diagnosis   ? Acute Eczematoid Otitis Externa   ? Chronic Sinusitis   ? Umbilical Hernia   ? Chronic Left Rotator Cuff Sprain (Capsule)   ? Localized Osteoarthritis Of Multiple Sites   ? Anxiety Disorder NOS   ? Tourette's Syndrome   ? Neck Pain   ? Bell's Palsy   ? Adhesive Capsulitis Of Left Shoulder   ? BPH (benign prostatic hyperplasia)   ? Partial Complex Seizure   ? Benign prostatic hypertrophy   ? Hyperlipidemia   ? Constipation       Hypovitaminosis D - he is likely to be deficient because of living in [place].  I have ordered a vitamin D level.  No results found for: [id number]    Lipid status - I calculated his risk using his 2014 lipids.  I don't believe that updating them would significantly change his risk.  He has a 30.4% risk of a heart related and then the next 10 years.  I recommended a statin medication for him.  He will take this under advisement.  Lab Results   Component Value Date    CHOL 165 10/31/2014    CHOL 108 06/11/2013    CHOL 111 12/19/2012     Lab Results   Component Value Date    HDL 50 10/31/2014    HDL 53 06/11/2013    HDL 40 12/19/2012     Lab Results   Component Value Date    LDLCALC 104 10/31/2014    LDLCALC 48.2 06/11/2013    LDLCALC 62 12/19/2012     Lab Results   Component Value Date    TRIG 53 10/31/2014    TRIG 34 06/11/2013    TRIG 42 12/19/2012     Thyroid status - I will screen for hypothyroidism today.  No results found for: TSH    CBC monitoring - normal CBC last month.  Lab Results   Component Value Date    WBC 6.5 01/05/2017    HGB 14.5 01/05/2017    HCT 43.7 01/05/2017    MCV 97 01/05/2017      "01/05/2017     Review of systems    CVS - he denies typical and atypical angina, orthopnea, PND, palpitation, edema, syncope, or limiting dyspnea.  Pulm - he has had a cough since he was a kid.  It's always been there.\"  Constant drainage/phlegm in throat.  Hemoptysis - denied.  Pleurisy - denied.  Asthma - denied.  TB - denied.  Pneumonia - in the 5th grade he had \"very slight pneumonia,\" though he was in the hospital for a few days.  Vision - he has had issues with his vision, temporary loss, \"I could feel it coming on.  I knew it was happening.\"  Every event would last about half an hour.  He has had only three of them.  The last one was about \"a year and a half ago.\"  NM - there was talk of a seizure disorder.  There was talk of a \"MAYBE a mini-stroke.\"  He has always come back to normal within a half hour or so.\"  Heme - he bruises his hands and forearms easily due to ASA.    Physical examination  General-this is a well-developed, well-nourished  American gentleman who is mildly overweight and in no acute distress.  Hair /scalp - very short hair - male pattern loss  Ears - the canals are normal.  The drums are dull bilaterally.  There is no light reflex.  Nose - patent nares without lesions.  Mouth - missing lower molars.  Neck - 2+/2+ carotids.  There is no adenopathy.  His submandibular salivary glands are relatively prominent.  Chest - clear to percussion and auscultation.  No rales, rhonchi, or wheezes.  Heart - regular and without murmur.    OBTW - taking Miralax, prunes, grapes, raisins and fruit.  His big problem is constipation.    Impression    1.  Prostatism, treated elsewhere.  2.  Increased risk for ASCVD event.  3.  Constipation  4.  Three episodes of visual loss, never fully characterized.  This has apparently been thoroughly evaluated, including neurology consultation in the past, and no clear diagnosis has been fully agreed upon by those involved with evaluating it.  5.  Jerking legs " "greater than arms in bed at night    Plan    1.  Neurology consultation for his sleep jerks versus restless legs versus \"other\".  2.  I talked about my philosophy for treating constipation, that being that he should always prevented rather than treating it.  He has recently tried Metamucil, and thinks it may help.  I explained that whatever he takes for constipation, he should take it every day, without fail.  I went through the standard \"an ounce of prevention is worth a pound of cure\" philosophy.  3.  Lab studies today.  4.  I recommended a statin.  5.  Today's visit lasted 40 minutes and was scheduled for 40 minutes.  Greater than 50% of this visit was spent in counseling and coordination of care.  I talked at some length about his OBTW complaint (constipation), why I recommended a statin, calculated value for him, talked about his previous visual disturbances, talked about his jerking legs, and that there may be something that can be used to treat it, indicated why I'm getting lab studies, and so on.      Much or all of the text in this note was generated through the use of Dragon Dictate voice-to-text software.  Errors in spelling or words which seem out of context are unintentional.  Dragon has a significant issue with pronouns and, of course, homonyms.  Errors with words of this sort may escape editing.      Patient Instructions     1.  What about my cholesterol?    Lab Results   Component Value Date    CHOL 165 10/31/2014    CHOL 108 06/11/2013    CHOL 111 12/19/2012     Lab Results   Component Value Date    HDL 50 10/31/2014    HDL 53 06/11/2013    HDL 40 12/19/2012     Lab Results   Component Value Date    LDLCALC 104 10/31/2014    LDLCALC 48.2 06/11/2013    LDLCALC 62 12/19/2012     Lab Results   Component Value Date    TRIG 53 10/31/2014    TRIG 34 06/11/2013    TRIG 42 12/19/2012     2.  Do I need to have treatment for my cholesterol?  I would recommend that you be on treatment for your " "cholesterol.    New recommendations were published from the American Heart Association and the American College of Cardiology regarding whether to treat cholesterol values IF there is not current treatment in use.  These guidelines were published in the Journal Circulation in December, 2013.  There is a calculator on the Internet that takes into account \"risk factors\", including your age, ethnicity, gender, smoking, blood pressure, total cholesterol, HDL (the \"good\" cholesterol), diabetes and whether you are being treated for high blood pressure.    If the calculated risk for a heart-related event (for example, heart attack, sudden death, the start of heart related chest pain [angina] and coronary artery bypass surgery/stenting) is greater than 7.5% treatment with a \"statin\" drug is most likely appropriate.  Sometimes you and I will try to find a risk factor that can be modified rather than starting treatment.  Statin drugs include Lipitor (atorvastatin), Crestor (rosuvastatin), Pravachol (pravastatin) and Mevacor (lovastatin).  Pravastatin and lovastatin are available from [employer] for $4 a month and $10 for three month's supply, so a statin doesn't need to cost a great deal of money.    The risk calculator provides the following risk for you:     ASCVD Risk Evaluation  10-year risk of atherosclerotic cardiovascular disease: 30.7%   10-year risk in a similar patient with optimal risk factors : 24%     3.  You have elected to ponder whether you want to be treated with a statin medication or not.  If I were you, and I'm conservative about drugs, I would be taking something.    4.  Constipation - needs to prevented, not treated.  So I recommend that you take 1 to 1-1/2 capfuls of MiraLAX every day.  Do not go without it.      5.  If you would prefer to take MetaMucil every day, that would be fine, but be sure ot take it every day.  You may take it up to three times a day.    6.  You are having shaking in bed " at night, even when you are not cold.  You can stop it voluntarily, but it bothers you.  I recommend that you see Dr. Arauz again, as this could be a number of things, and there may be more than one treatment or no treatment advised.  He is the expert at involuntary movements.     Call:  [phone] to set up that appointment.

## 2023-07-25 NOTE — LETTER
9/15/2020         RE: Wayne Carlton  [address] [id number]        Dear Colleague,    Thank you for referring your patient, Wayne Carlton, to the [facility] NEUROLOG[facility] [place]. Please see a copy of my visit note below.        NEUROLOGY NOTE        Assessment/Plan        Recurrent spells: seizure most likely  Memory difficulty  Tourette syndrome  Anxiety  Chronic constipation    Plan:   Check B12 and thiamine.   Check orthostatic Bp 2 days/week, and 2 time /day. Bring journal to next visit.   EEG followed by clinic visit with memory test      This is a telephone visit due to COVID-19 Pandemic to mitigate potential disease spreading. Consent to charge obtained for call visit. Total time spent about 30 minutes.         SUBJECTIVE       Wayne Carlton is a 86 year old male seen for memory difficulty and spells.      See Dr. Cohn in 2015 for seizure activity versus a TIA,  He has had numerous episodes over the years, usually 1/year, but had 3 spell within last 3 months,  where he will suddenly get difficulty speaking this can last 15 to 45 minutes and then resolve spontaneously.  It usually starts with vision difficulty in the right. Feels weak afterwards for 2-4 hours. His first event was in 2001. He was just seen in the emergency room on August 16 exactly for this.  He had a CTA of the head and neck as well as an MRI of the brain that did not show any acute findings.     He tried keppra and zonegran not able to tolerate.  seizure medication in the past, however he did not tolerate it and did not stay on it long-term.     Memory: not doing well.     EEG 2013 normal.        IMPRESSION: 8/2020  CONCLUSION:   HEAD CT:  1.  No evidence of acute hemorrhage, mass effect, or acute vascular territorial infarction.  2.  Age-related changes as above.     HEAD CTA:   1.  No evidence of proximal large vessel occlusion or flow-limiting stenosis.     NECK CTA:  1.  No evidence of  "hemodynamically significant stenosis based on NASCET criteria.  2.  No evidence of dissection or pseudoaneurysm.     IMPRESSION: 8/16/20 MRI  1.  No acute infarct, mass, mass effect, or hemorrhage.  2.  Moderate atrophy.  3.  Mild to moderate chronic small vessel ischemia.    8/2020 labs:  CBC, BMP, troponin, INR, PTT and ESR nl in 1 and 8/2020  Had abnormal LFT 12/2019, returned normal 1/2020.              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  Fluphenazine-prescribed but patient did not take, a future possibility.       Chronic sinusitis 09/15/2020     Priority: Medium     Longstanding  constant nasal congestion and post-nasal drainage  Tried-numerous sprays, etc       Anxiety 09/15/2020     Priority: Medium     Longstanding  Prior meds       TIA (transient ischemic attack) 07/21/2018     Priority: Medium     Presumed.  See also concern for a partial complex seizures versus TIAs  Treat as TIA  ASA, simvastatin       Rotator cuff tear 07/06/2018     Priority: Medium     Left sided  No surgery       DDD (degenerative disc disease), cervical 07/06/2018     Priority: Medium     Conservative           Past medical history     History reviewed. No pertinent surgical " history.    History reviewed. No pertinent past medical history.        Family history     Family History   Problem Relation Age of Onset     Cancer Father       Jaundice Mother            at 97, with jaundice.     Leukemia Father            at 47 - sounds like AML     Heart disease Brother            of a heart attack.     Hypertension Brother       Blindness Brother          Social history     Social History     Socioeconomic History     Marital status:      Spouse name: Not on file     Number of children: Not on file     Years of education: Not on file     Highest education level: Not on file   Occupational History     Not on file   Social Needs     Financial resource strain: Not on file     Food insecurity     Worry: Not on file     Inability: Not on file     Transportation needs     Medical: Not on file     Non-medical: Not on file   Tobacco Use     Smoking status: Never Smoker     Smokeless tobacco: Never Used   Substance and Sexual Activity     Alcohol use: Not Currently     Drug use: Not on file     Sexual activity: Not on file   Lifestyle     Physical activity     Days per week: Not on file     Minutes per session: Not on file     Stress: Not on file   Relationships     Social connections     Talks on phone: Not on file     Gets together: Not on file     Attends Congregation service: Not on file     Active member of club or organization: Not on file     Attends meetings of clubs or organizations: Not on file     Relationship status: Not on file     Intimate partner violence     Fear of current or ex partner: Not on file     Emotionally abused: Not on file     Physically abused: Not on file     Forced sexual activity: Not on file   Other Topics Concern     Parent/sibling w/ CABG, MI or angioplasty before 65F 55M? Not Asked   Social History Narrative     Not on file         Allergy     Sulfa drugs    MEDICATIONS List     Current Outpatient Medications   Medication Sig Dispense Refill      aspirin 81 MG EC tablet Take 81 mg by mouth daily       docusate sodium (COLACE) 100 MG tablet Take 100 mg by mouth       melatonin (MELATONIN MAXIMUM STRENGTH) 5 MG tablet Take 5 mg by mouth       tamsulosin (FLOMAX) 0.4 MG capsule TAKE 1 CAPSULE (0.4 MG TOTAL) BY MOUTH DAILY AFTER SUPPER. ** MUST BEE SEEN FOR FUTURE FILLS                   Mikayla Garcia MD, MD, PhD  Neurology   Office tel: [phone]        Again, thank you for allowing me to participate in the care of your patient.        Sincerely,        Mikayla Garcia MD     Abbe Flap (Lower To Upper Lip) Text: The defect of the upper lip was assessed and measured.  Given the location and size of the defect, an Abbe flap was deemed most appropriate. Using a sterile surgical marker, an appropriate Abbe flap was measured and drawn on the lower lip. Local anesthesia was then infiltrated. A scalpel was then used to incise the upper lip through and through the skin, vermilion, muscle and mucosa, leaving the flap pedicled on the opposite side.  The flap was then rotated and transferred to the lower lip defect.  The flap was then sutured into place with a three layer technique, closing the orbicularis oris muscle layer with subcutaneous buried sutures, followed by a mucosal layer and an epidermal layer.

## 2024-04-25 ENCOUNTER — DOCUMENTATION ONLY (OUTPATIENT)
Dept: OTHER | Facility: CLINIC | Age: 89
End: 2024-04-25
Payer: COMMERCIAL

## 2025-06-03 NOTE — TELEPHONE ENCOUNTER
Refill Approved    Rx renewed per Medication Renewal Policy. Medication was last renewed on 6/12/20.    Haydee Luo, [facility] Triage/Med Refill 9/7/2020     Requested Prescriptions   Pending Prescriptions Disp Refills     tamsulosin (FLOMAX) 0.4 mg cap [Pharmacy Med Name: TAMSULOSIN HCL 0.4 MG CAPSULE] 90 capsule 0     Sig: TAKE 1 CAPSULE (0.4 MG TOTAL) BY MOUTH DAILY AFTER SUPPER. ** MUST BEE SEEN FOR FUTURE FILLS       Alfuzosin/Tamsulosin/Silodosin Refill Protocol  Passed - 9/6/2020 12:24 AM        Passed - PCP or prescribing provider visit in past 12 months       Last office visit with prescriber/PCP: 8/25/2020 Zac Colon MD OR same dept: 8/25/2020 Zac Colon MD OR same specialty: 8/25/2020 Zac Colon MD  Last physical: Visit date not found Last MTM visit: Visit date not found   Next visit within 3 mo: Visit date not found  Next physical within 3 mo: Visit date not found  Prescriber OR PCP: Zac Colon MD  Last diagnosis associated with med order: 1. Benign prostatic hyperplasia without lower urinary tract symptoms  - tamsulosin (FLOMAX) 0.4 mg cap [Pharmacy Med Name: TAMSULOSIN HCL 0.4 MG CAPSULE]; TAKE 1 CAPSULE (0.4 MG TOTAL) BY MOUTH DAILY AFTER SUPPER. ** MUST BEE SEEN FOR FUTURE FILLS  Dispense: 90 capsule; Refill: 0    If protocol passes may refill for 12 months if within 3 months of last provider visit (or a total of 15 months).                             [TextEntry] : The patient was advised of the diagnosis. The natural history of the pathology was explained in full to the patient in layman's terms. All questions were answered. The risks and benefits of surgical and non-surgical treatment alternatives were explained in full to the patient.  Surgical Intervention discussed with the patient, he will speak to his wife to make a decision.   The patient was referred to a sports medicine specialist for a second opinion and surgical consult.  MRI results discussed, questions answered.   Patient may weightbear and perform activity as tolerated.  Follow up as needed.